# Patient Record
Sex: MALE | Race: WHITE | NOT HISPANIC OR LATINO | Employment: FULL TIME | URBAN - METROPOLITAN AREA
[De-identification: names, ages, dates, MRNs, and addresses within clinical notes are randomized per-mention and may not be internally consistent; named-entity substitution may affect disease eponyms.]

---

## 2017-01-24 ENCOUNTER — ANTICOAGULATION VISIT (OUTPATIENT)
Dept: CARDIOLOGY | Facility: CLINIC | Age: 57
End: 2017-01-24

## 2017-01-24 LAB — INR PPP: 3

## 2017-02-22 LAB — INR PPP: 2.5

## 2017-02-23 ENCOUNTER — ANTICOAGULATION VISIT (OUTPATIENT)
Dept: CARDIOLOGY | Facility: CLINIC | Age: 57
End: 2017-02-23

## 2017-03-22 ENCOUNTER — ANTICOAGULATION VISIT (OUTPATIENT)
Dept: CARDIOLOGY | Facility: CLINIC | Age: 57
End: 2017-03-22

## 2017-03-22 LAB — INR PPP: 2.7

## 2017-04-26 ENCOUNTER — ANTICOAGULATION VISIT (OUTPATIENT)
Dept: CARDIOLOGY | Facility: CLINIC | Age: 57
End: 2017-04-26

## 2017-04-26 LAB — INR PPP: 2.4

## 2017-05-24 ENCOUNTER — ANTICOAGULATION VISIT (OUTPATIENT)
Dept: CARDIOLOGY | Facility: CLINIC | Age: 57
End: 2017-05-24

## 2017-05-24 LAB — INR PPP: 2.7

## 2017-06-27 ENCOUNTER — ANTICOAGULATION VISIT (OUTPATIENT)
Dept: CARDIOLOGY | Facility: CLINIC | Age: 57
End: 2017-06-27

## 2017-06-27 LAB — INR PPP: 2.4

## 2017-07-28 ENCOUNTER — ANTICOAGULATION VISIT (OUTPATIENT)
Dept: CARDIOLOGY | Facility: CLINIC | Age: 57
End: 2017-07-28

## 2017-07-28 LAB — INR PPP: 2.4

## 2017-08-04 ENCOUNTER — OFFICE VISIT (OUTPATIENT)
Dept: UROLOGY | Facility: CLINIC | Age: 57
End: 2017-08-04

## 2017-08-04 VITALS
WEIGHT: 190 LBS | SYSTOLIC BLOOD PRESSURE: 110 MMHG | HEART RATE: 82 BPM | DIASTOLIC BLOOD PRESSURE: 62 MMHG | OXYGEN SATURATION: 96 % | TEMPERATURE: 98 F | BODY MASS INDEX: 29.76 KG/M2

## 2017-08-04 DIAGNOSIS — R97.20 ELEVATED PROSTATE SPECIFIC ANTIGEN (PSA): Primary | ICD-10-CM

## 2017-08-04 DIAGNOSIS — N13.8 BPH (BENIGN PROSTATIC HYPERTROPHY) WITH URINARY OBSTRUCTION: ICD-10-CM

## 2017-08-04 DIAGNOSIS — N40.1 BPH (BENIGN PROSTATIC HYPERTROPHY) WITH URINARY OBSTRUCTION: ICD-10-CM

## 2017-08-04 LAB
BILIRUB BLD-MCNC: NEGATIVE MG/DL
CLARITY, POC: CLEAR
COLOR UR: YELLOW
GLUCOSE UR STRIP-MCNC: NEGATIVE MG/DL
KETONES UR QL: NEGATIVE
LEUKOCYTE EST, POC: NEGATIVE
NITRITE UR-MCNC: NEGATIVE MG/ML
PH UR: 6 [PH] (ref 5–8)
PROT UR STRIP-MCNC: ABNORMAL MG/DL
RBC # UR STRIP: NEGATIVE /UL
SP GR UR: 1.03 (ref 1–1.03)
UROBILINOGEN UR QL: NORMAL

## 2017-08-04 PROCEDURE — 81003 URINALYSIS AUTO W/O SCOPE: CPT | Performed by: UROLOGY

## 2017-08-04 PROCEDURE — 76857 US EXAM PELVIC LIMITED: CPT | Performed by: UROLOGY

## 2017-08-04 PROCEDURE — 99203 OFFICE O/P NEW LOW 30 MIN: CPT | Performed by: UROLOGY

## 2017-08-04 RX ORDER — RANITIDINE 150 MG/1
TABLET ORAL
COMMUNITY
Start: 2015-04-06 | End: 2017-08-04 | Stop reason: SDUPTHER

## 2017-08-04 RX ORDER — CLOPIDOGREL BISULFATE 75 MG/1
TABLET ORAL DAILY
COMMUNITY
Start: 2015-02-06 | End: 2017-08-04

## 2017-08-04 RX ORDER — LOSARTAN POTASSIUM 25 MG/1
TABLET ORAL
COMMUNITY
Start: 2015-02-06 | End: 2017-08-04 | Stop reason: DRUGHIGH

## 2017-08-04 NOTE — PROGRESS NOTES
Chief Complaint  Elevated PSA (Anthony Huitron ref patient for a psa of 4.4)        MANUEL Ortega is a 56 y.o. male who is referred for evaluation of his prostate after a recent PSA was elevated at 4.4.  He's had this checked before one to 2 years ago and he thinks it was about 4.2.  Of some concern is that his brother is currently being treated for prostate cancer.  He has a history of coronary artery disease has had a stent inserted and is currently taking Coumadin and aspirin although he is off Plavix.    Vitals:    08/04/17 1040   BP: 110/62   Pulse: 82   Temp: 98 °F (36.7 °C)   SpO2: 96%       Past Medical History  Past Medical History:   Diagnosis Date   • Atrial fibrillation    • Coronary artery disease    • Dyslipidemia    • Hypertension     Borderline hypertension.   • Lateral myocardial infarction     01/08/2015: Lateral STEMI with emergent 3.0 x 18 Xience KENZIE to first diagonal, non-obstructive disease otherwise. EF 45%.        Past Surgical History  Past Surgical History:   Procedure Laterality Date   • CORONARY ANGIOPLASTY WITH STENT PLACEMENT  01/08/2015 01/08/2015: Lateral STEMI with emergent 3.0 x 18 Xience KENZEI to first diagonal, non-obstructive disease otherwise. EF 45%.        Medications  has a current medication list which includes the following prescription(s): aspirin, atorvastatin, losartan, nitroglycerin, ranitidine, and warfarin.      Allergies  Allergies   Allergen Reactions   • Penicillins        Social History  Social History     Social History Narrative       Family History  He has no family history of bladder or kidney cancer  He has no family history of kidney stones      AUA Symptom Score:      Review of Systems  Review of Systems   Constitutional: Negative.    Genitourinary: Negative.    All other systems reviewed and are negative.      Physical Exam  Physical Exam   Constitutional: He is oriented to person, place, and time. He appears well-developed and well-nourished.   HENT:    Head: Normocephalic and atraumatic.   Neck: Normal range of motion.   Pulmonary/Chest: Effort normal. No respiratory distress.   Abdominal: Soft. He exhibits no distension and no mass. There is no tenderness. No hernia. Hernia confirmed negative in the right inguinal area and confirmed negative in the left inguinal area.   Genitourinary: Rectum normal, prostate normal, testes normal and penis normal.   Musculoskeletal: Normal range of motion.   Lymphadenopathy:     He has no cervical adenopathy. No inguinal adenopathy noted on the right or left side.   Neurological: He is alert and oriented to person, place, and time.   Skin: Skin is warm and dry.   Psychiatric: He has a normal mood and affect. His behavior is normal.   Vitals reviewed.      Labs Recent and today in the office:  Results for orders placed or performed in visit on 08/04/17   POC Urinalysis Dipstick, Automated   Result Value Ref Range    Color Yellow Yellow, Straw, Dark Yellow, Greer    Clarity, UA Clear Clear    Glucose, UA Negative Negative, 1000 mg/dL (3+) mg/dL    Bilirubin Negative Negative    Ketones, UA Negative Negative    Specific Gravity  1.030 1.005 - 1.030    Blood, UA Negative Negative    pH, Urine 6.0 5.0 - 8.0    Protein, POC Trace (A) Negative mg/dL    Urobilinogen, UA Normal Normal    Leukocytes Negative Negative    Nitrite, UA Negative Negative         Assessment & Plan    His digital rectal exam is benign.  A pelvic ultrasound reveals a 34 g prostate which calculates a PSA density of 0.13 in the benign range.  The patient is instructed to eat a heart healthy diet to lower his risk of prostate cancer although he mainly eats beef and doesn't like chicken.  A total to free PSA ratio is obtained today and he will return in 4 months for close surveillance.  If his risk progresses he will need a prostate biopsy.

## 2017-08-04 NOTE — PROGRESS NOTES
[facility]- UROLOGY  [address], [address]  [place], [place] [address]  [phone]      08/04/2017    Eliz Ortega  1960        Pelvic Ultrasound with PVR    A transabdominal pelvic ultrasound was performed using a 3.5 MHz transducer of a B-K Salazar through the suprapubic area.     The purpose of the study was to investigate the patient's voiding difficulty.  There was minimal bladder wall thickening noted.  There were no intravesical filling defects seen.  The post void residual of 34.9 ml was noted.  Prostate was homogeneous and was noted to be 33.8 grams.  There was a protrusion of the prostate into the bladder.  Ultrasound images will be scanned into the chart for reference.    PSA density is 0.13 with normal less than than 0.15.   CPT code 36661        Performed by Chad Xiong MD

## 2017-08-05 LAB
PSA FREE MFR SERPL: 16.4 %
PSA FREE SERPL-MCNC: 0.97 NG/ML
PSA SERPL-MCNC: 5.9 NG/ML (ref 0–4)

## 2017-08-23 ENCOUNTER — ANTICOAGULATION VISIT (OUTPATIENT)
Dept: CARDIOLOGY | Facility: CLINIC | Age: 57
End: 2017-08-23

## 2017-08-23 LAB — INR PPP: 2.9

## 2017-09-07 ENCOUNTER — OFFICE VISIT (OUTPATIENT)
Dept: CARDIOLOGY | Facility: CLINIC | Age: 57
End: 2017-09-07

## 2017-09-07 VITALS
BODY MASS INDEX: 29.6 KG/M2 | SYSTOLIC BLOOD PRESSURE: 134 MMHG | WEIGHT: 188.6 LBS | HEIGHT: 67 IN | HEART RATE: 96 BPM | DIASTOLIC BLOOD PRESSURE: 96 MMHG

## 2017-09-07 DIAGNOSIS — I10 ESSENTIAL HYPERTENSION: ICD-10-CM

## 2017-09-07 DIAGNOSIS — I25.10 CORONARY ARTERY DISEASE INVOLVING NATIVE CORONARY ARTERY OF NATIVE HEART WITHOUT ANGINA PECTORIS: Primary | ICD-10-CM

## 2017-09-07 DIAGNOSIS — I48.0 PAROXYSMAL ATRIAL FIBRILLATION (HCC): ICD-10-CM

## 2017-09-07 DIAGNOSIS — E78.2 MIXED HYPERLIPIDEMIA: ICD-10-CM

## 2017-09-07 PROCEDURE — 99214 OFFICE O/P EST MOD 30 MIN: CPT | Performed by: INTERNAL MEDICINE

## 2017-09-07 RX ORDER — BISOPROLOL FUMARATE 5 MG/1
2.5 TABLET, FILM COATED ORAL DAILY
COMMUNITY

## 2017-09-07 NOTE — PROGRESS NOTES
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 Coronary artery disease:  a. 01/08/2015: Lateral STEMI with emergent 3.0 x 18 Xience KENZIE to first diagonal, non-obstructive disease  otherwise. EF 45%.   2. Atrial fibrillation, new onset, unclear duration:   a. Cardioversion x2, 01/08/2015 and 01/09/2015, with conversion to sinus mechanism.  Initiation of sotalol  and warfarin.   b. 02/05/2015: Documentation of recurrent atrial fibrillation, unknown duration, asymptomatic.  Committed to rate control anticoagulation.   3. Dyslipidemia.   4. Borderline hypertension.     Allergies  Allergies   Allergen Reactions   • Penicillins        Current Medications    Current Outpatient Prescriptions:   •  aspirin 81 MG EC tablet, Take 81 mg by mouth Daily., Disp: , Rfl:   •  atorvastatin (LIPITOR) 20 MG tablet, Take  by mouth., Disp: , Rfl:   •  bisoprolol (ZEBeta) 5 MG tablet, Take 2.5 mg by mouth Daily., Disp: , Rfl:   •  losartan (COZAAR) 12.5 MG half tablet, Take 12.5 mg by mouth Daily., Disp: , Rfl:   •  nitroglycerin (NITROSTAT) 0.4 MG SL tablet, Place  under the tongue., Disp: , Rfl:   •  ranitidine (ZANTAC) 150 MG tablet, Take 150 mg by mouth 2 (Two) Times a Day., Disp: , Rfl:   •  warfarin (COUMADIN) 10 MG tablet, Take 1 tablet by mouth Daily., Disp: 90 tablet, Rfl: 0      History of Present Illness   Patient here for routine 9 month follow-up.  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  Follows regularly with PCP.   "States he very sporadically checks BP at home.  Notices occasional palpitations with his A. fib however this does not bother him, he does not feel his heart \"racing\".  Pt denies any chest pain, dyspnea, dyspnea on exertion, orthopnea, PND, lower extremity edema, or claudication    ROS:  All systems have been reviewed and are negative with the exception of those mentioned in the HPI.    OBJECTIVE:  Vitals:    09/07/17 1100   BP: 134/96   BP Location: Right arm   Patient Position: Sitting   Pulse: 96   Weight: 188 lb 9.6 oz (85.5 kg)   Height: 67\" (170.2 cm)     Physical Exam   Constitutional: He is oriented to person, place, and time. He appears well-developed and well-nourished. No distress.   HENT:   alopecia   Cardiovascular: Normal rate, regular rhythm, normal heart sounds and intact distal pulses.    No murmur heard.  Pulses:       Radial pulses are 2+ on the right side, and 2+ on the left side.        Dorsalis pedis pulses are 2+ on the right side, and 2+ on the left side.        Posterior tibial pulses are 2+ on the right side, and 2+ on the left side.   Pulmonary/Chest: Effort normal and breath sounds normal. He has no wheezes. He has no rales.   Abdominal: Soft. Bowel sounds are normal. There is no tenderness. There is no guarding.   Musculoskeletal: He exhibits no edema or tenderness.   Neurological: He is alert and oriented to person, place, and time.   Skin: Skin is warm and dry. No rash noted.   Psychiatric: He has a normal mood and affect.   Nursing note and vitals reviewed.      Diagnostic Data:  Procedures      ASSESSMENT:   Diagnosis Plan   1. Coronary artery disease involving native coronary artery of native heart without angina pectoris     2. Paroxysmal atrial fibrillation     3. Essential hypertension     4. Mixed hyperlipidemia         PLAN:  1. No anginal equivalent, patient stays active with yard work, an active job.  Continue medical management  2. Rate controlled and anticoagulated with " warfarin, INRs are monitored  3. Slightly up today with historical acceptable control,, patient encouraged to check BP more often at home, continue antihypertensives  4. Labs per PCP, continue statin therapy on atorvastatin    RTC 9 months, sooner if needed    Anthony Mukherjee MD, thank you for referring Mr. Ortega for evaluation.  I have forwarded my electronically generated recommendations to you for review.  Please do not hesitate to call with any questions.    Scribed for Otilio Sanchez MD by Ana Rosa Cain PA-C. 9/7/2017  11:25 AM  I, Otilio Sanchez MD, personally performed the services described in this documentation as scribed by the above named individual in my presence, and it is both accurate and complete.  9/7/2017  12:01 PM    Otilio Sanchez MD, [facility]

## 2017-09-26 ENCOUNTER — ANTICOAGULATION VISIT (OUTPATIENT)
Dept: PHARMACY | Facility: HOSPITAL | Age: 57
End: 2017-09-26

## 2017-09-26 LAB — INR PPP: 2.3

## 2017-09-26 NOTE — PROGRESS NOTES
Anticoagulation Clinic - Remote Progress Note  REMOTE LAB    Indication: afib  Referring Provider: Laura  Initial Warfarin Start Date:   Goal INR: 2.0-3.0  Current Drug Interactions: aspirin, ranitidine  CHADS-VASc:     Diet: GLV: not at all  Alcohol: none  Tobacco: 1 can of dip/smokeless tobacco a day  OTC Pain Medication: Ibuprofen or APAP -- advised to minimize ibuprofen use, if at all, and to use APAP whenever possible    INR History:  Date 8/23 9/26          Total Weekly Dose 70mg 70mg          INR 2.9 2.3          Notes  1st clinic            Phone Interview:  Tablet Strength: pt has 10mg tablets  Current Maintenance Dose: 10mg every day  Patient Findings      Negatives Signs/symptoms of thrombosis, Signs/symptoms of bleeding, Laboratory test error suspected, Change in health, Change in alcohol use, Change in activity, Upcoming invasive procedure, Emergency department visit, Upcoming dental procedure, Missed doses, Extra doses, Change in medications, Change in diet/appetite, Hospital admission, Bruising, Other complaints     Comments Wife says he uses 1 can of dip/smokeless tobacco a day     Patient Contact Info: [phone] or [phone] (Wife, Geni)  Lab Contact Info: [facility] [phone]    Plan:  1. INR is therapeutic. Instructed pt to continue maintenance dose of warfarin 10mg every day.  2. Repeat INR in 4 weeks.  3. Pt requests warfarin refills called into [facility] in [place], [place].  4. Verbal information provided over the phone to pt's daughter. She RBV dosing instructions, expresses understanding, and has no further questions at this time.    Fredrick Rosales  9/26/2017  12:16 PM      IJessica, [facility], have reviewed the note in full and agree with the assessment and plan.  09/27/17  8:07 AM

## 2017-09-27 DIAGNOSIS — I48.91 ATRIAL FIBRILLATION, UNSPECIFIED TYPE (HCC): Primary | ICD-10-CM

## 2017-09-27 RX ORDER — WARFARIN SODIUM 10 MG/1
TABLET ORAL
Qty: 90 TABLET | Refills: 1 | OUTPATIENT
Start: 2017-09-27 | End: 2018-03-23 | Stop reason: SDUPTHER

## 2017-10-24 ENCOUNTER — ANTICOAGULATION VISIT (OUTPATIENT)
Dept: PHARMACY | Facility: HOSPITAL | Age: 57
End: 2017-10-24

## 2017-10-24 DIAGNOSIS — I48.0 PAROXYSMAL ATRIAL FIBRILLATION (HCC): ICD-10-CM

## 2017-10-24 LAB — INR PPP: 2.4

## 2017-10-24 NOTE — PROGRESS NOTES
Anticoagulation Clinic - Remote Progress Note  REMOTE LAB    Indication: afib  Referring Provider: Laura  Initial Warfarin Start Date:   Goal INR: 2.0-3.0  Current Drug Interactions: aspirin, ranitidine  CHADS-VASc:     Diet: GLV: not at all  Alcohol: none  Tobacco: 1 can of dip/smokeless tobacco a day  OTC Pain Medication: Ibuprofen or APAP -- advised to minimize ibuprofen use, if at all, and to use APAP whenever possible    INR History:  Date 8/23 9/26 10/24         Total Weekly Dose 70mg 70mg 70mg         INR 2.9 2.3 2.4         Notes  1st clinic            Phone Interview:  Tablet Strength: pt has 10mg tablets  Current Maintenance Dose: 10mg every day  Patient Findings      Negatives Signs/symptoms of thrombosis, Signs/symptoms of bleeding, Laboratory test error suspected, Change in health, Change in alcohol use, Change in activity, Upcoming invasive procedure, Emergency department visit, Upcoming dental procedure, Missed doses, Extra doses, Change in medications, Change in diet/appetite, Hospital admission, Bruising, Other complaints     Patient Contact Info: [phone] or [phone] (Wife, Geni)  Lab Contact Info: [facility] [phone]    Plan:  1. INR is therapeutic. Instructed pt's wife to continue maintenance dose of warfarin 10mg daily.  2. Repeat INR in 4 weeks.  3. Pt declines warfarin refills (last called in 9/26).  4. Verbal information provided over the phone to pt's wife. She RBV dosing instructions, expresses understanding, and has no further questions at this time.    Fredrick Rosales CPhT  10/24/2017  12:14 PM    Jessica MARTIN [facility], have reviewed the note in full and agree with the assessment and plan.  10/25/17  7:36 AM

## 2017-11-22 ENCOUNTER — ANTICOAGULATION VISIT (OUTPATIENT)
Dept: PHARMACY | Facility: HOSPITAL | Age: 57
End: 2017-11-22

## 2017-11-22 DIAGNOSIS — I48.91 ATRIAL FIBRILLATION, UNSPECIFIED TYPE (HCC): Primary | ICD-10-CM

## 2017-11-22 LAB — INR PPP: 2.9

## 2017-11-22 NOTE — PROGRESS NOTES
Line busy, unable to leave voicemail - 11/22@1054    Anticoagulation Clinic - Remote Progress Note  REMOTE LAB    Indication: afib  Referring Provider: Laura  Initial Warfarin Start Date:   Goal INR: 2.0-3.0  Current Drug Interactions: aspirin, ranitidine  CHADS-VASc:     Diet: GLV: not at all  Alcohol: none  Tobacco: 1 can of dip/smokeless tobacco a day  OTC Pain Medication: Ibuprofen or APAP -- advised to minimize ibuprofen use, if at all, and to use APAP whenever possible    INR History:  Date 8/23 9/26 10/24 11/22        Total Weekly Dose 70mg 70mg 70mg 70mg        INR 2.9 2.3 2.4 2.9        Notes  1st clinic            Phone Interview:  Tablet Strength: pt has 10mg tablets  Current Maintenance Dose: 10mg every day    Patient Findings      Negatives Signs/symptoms of thrombosis, Signs/symptoms of bleeding, Laboratory test error suspected, Change in health, Change in alcohol use, Change in activity, Upcoming invasive procedure, Emergency department visit, Upcoming dental procedure, Missed doses, Extra doses, Change in medications, Change in diet/appetite, Hospital admission, Bruising, Other complaints     Patient Contact Info: [phone] or [phone] (Wife, Geni)  Lab Contact Info: [facility] [place] [facility] - [phone]    Plan:  1. INR is therapeutic. Instructed pt to continue maintenance dose of warfarin 10mg daily.  2. Repeat INR in 4 weeks.  3. Pt declines warfarin refills (last called in 9/26).  4. Verbal information provided over the phone. Pt RBV dosing instructions, expresses understanding by teach back, and has no further questions at this time.  5. Pt requests new standing order be sent to [facility] [place] [facility], old one expires some time in December 2017    Fredrick Rosales CPhT  11/22/2017  10:50 AM

## 2017-11-28 NOTE — PROGRESS NOTES
I, Jessica Recio [facility], have reviewed the note in full and agree with the assessment and plan.  11/28/17  8:19 AM

## 2017-12-06 ENCOUNTER — LAB (OUTPATIENT)
Dept: UROLOGY | Facility: CLINIC | Age: 57
End: 2017-12-06

## 2017-12-06 DIAGNOSIS — R97.20 ELEVATED PSA: Primary | ICD-10-CM

## 2017-12-06 LAB — PSA SERPL-MCNC: 5.22 NG/ML (ref 0.06–4)

## 2017-12-12 ENCOUNTER — OFFICE VISIT (OUTPATIENT)
Dept: UROLOGY | Facility: CLINIC | Age: 57
End: 2017-12-12

## 2017-12-12 VITALS
SYSTOLIC BLOOD PRESSURE: 135 MMHG | TEMPERATURE: 98.4 F | BODY MASS INDEX: 29.51 KG/M2 | HEIGHT: 67 IN | DIASTOLIC BLOOD PRESSURE: 76 MMHG | HEART RATE: 79 BPM | WEIGHT: 188 LBS | OXYGEN SATURATION: 96 %

## 2017-12-12 DIAGNOSIS — Z80.42 FAMILY HISTORY OF PROSTATE CANCER: ICD-10-CM

## 2017-12-12 DIAGNOSIS — Z87.898 HISTORY OF ELEVATED PSA: Primary | ICD-10-CM

## 2017-12-12 LAB
BILIRUB BLD-MCNC: NEGATIVE MG/DL
CLARITY, POC: CLEAR
COLOR UR: YELLOW
GLUCOSE UR STRIP-MCNC: NEGATIVE MG/DL
KETONES UR QL: NEGATIVE
LEUKOCYTE EST, POC: NEGATIVE
NITRITE UR-MCNC: NEGATIVE MG/ML
PH UR: 6 [PH] (ref 5–8)
PROT UR STRIP-MCNC: NEGATIVE MG/DL
RBC # UR STRIP: NEGATIVE /UL
SP GR UR: 1.02 (ref 1–1.03)
UROBILINOGEN UR QL: NORMAL

## 2017-12-12 PROCEDURE — 81003 URINALYSIS AUTO W/O SCOPE: CPT | Performed by: UROLOGY

## 2017-12-12 PROCEDURE — 99213 OFFICE O/P EST LOW 20 MIN: CPT | Performed by: UROLOGY

## 2017-12-12 NOTE — PROGRESS NOTES
Chief Complaint  Elevated PSA (family history of prostate cancer in his brother. Patient is here for 4 month fup.)        MANUEL Ortega is a 57 y.o. male who returns today for follow-up of his prostate in light of a elevated PSA and a family history of prostate cancer and his older brother.  On his last visit a total free ratio indicated a 23% chance of at least microscopic disease.  Fortunately during the last 4 months his PSA is actually declined from 5.9 5.2.  He continues to minimize any difficulty voiding.    Vitals:    12/12/17 1304   BP: 135/76   Pulse: 79   Temp: 98.4 °F (36.9 °C)   SpO2: 96%       Past Medical History  Past Medical History:   Diagnosis Date   • Atrial fibrillation    • Coronary artery disease    • Dyslipidemia    • Hypertension     Borderline hypertension.   • Lateral myocardial infarction     01/08/2015: Lateral STEMI with emergent 3.0 x 18 Xience KENZIE to first diagonal, non-obstructive disease otherwise. EF 45%.        Past Surgical History  Past Surgical History:   Procedure Laterality Date   • CARDIAC CATHETERIZATION     • CORONARY ANGIOPLASTY WITH STENT PLACEMENT  01/08/2015 01/08/2015: Lateral STEMI with emergent 3.0 x 18 Xience KENZIE to first diagonal, non-obstructive disease otherwise. EF 45%.    • FINGER SURGERY         Medications  has a current medication list which includes the following prescription(s): aspirin, atorvastatin, bisoprolol, losartan, nitroglycerin, ranitidine, and warfarin.      Allergies  Allergies   Allergen Reactions   • Penicillins        Social History  Social History     Social History Narrative       Family History  He has no family history of bladder or kidney cancer  He has no family history of kidney stones      AUA Symptom Score:      Review of Systems  Review of Systems    Physical Exam  Physical Exam   Constitutional: He is oriented to person, place, and time. He appears well-developed and well-nourished.   HENT:   Head: Normocephalic and atraumatic.    Neck: Normal range of motion.   Pulmonary/Chest: Effort normal. No respiratory distress.   Abdominal: Soft. He exhibits no distension and no mass. There is no tenderness. No hernia.   Genitourinary: Rectum normal and prostate normal.   Musculoskeletal: Normal range of motion.   Lymphadenopathy:     He has no cervical adenopathy.   Neurological: He is alert and oriented to person, place, and time.   Skin: Skin is warm and dry.   Psychiatric: He has a normal mood and affect. His behavior is normal.   Vitals reviewed.      Labs Recent and today in the office:  Results for orders placed or performed in visit on 12/12/17   POC Urinalysis Dipstick, Automated   Result Value Ref Range    Color Yellow Yellow, Straw, Dark Yellow, Greer    Clarity, UA Clear Clear    Glucose, UA Negative Negative, 1000 mg/dL (3+) mg/dL    Bilirubin Negative Negative    Ketones, UA Negative Negative    Specific Gravity  1.025 1.005 - 1.030    Blood, UA Negative Negative    pH, Urine 6.0 5.0 - 8.0    Protein, POC Negative Negative mg/dL    Urobilinogen, UA Normal Normal    Leukocytes Negative Negative    Nitrite, UA Negative Negative         Assessment & Plan  Elevated PSA: Digital rectal exam reveals an enlarged prostate with minimal induration and no suspicious nodularity.  He's informed there is roughly a 10% chance of clinically significant cancer at this time and considering he takes Coumadin to prevent a stroke from atrial fibrillation I feel the biopsy risk outweighs the benefit.  He actually declines the suggestion of a biopsy at this time in any case.  I strongly encouraged close surveillance so he will return in 3 months with PSA.  Also recommend a heart healthy diet which may reduce his risk of getting prostate cancer.

## 2017-12-19 ENCOUNTER — ANTICOAGULATION VISIT (OUTPATIENT)
Dept: PHARMACY | Facility: HOSPITAL | Age: 57
End: 2017-12-19

## 2017-12-19 ENCOUNTER — TELEPHONE (OUTPATIENT)
Dept: PHARMACY | Facility: HOSPITAL | Age: 57
End: 2017-12-19

## 2017-12-19 DIAGNOSIS — I48.91 ATRIAL FIBRILLATION, UNSPECIFIED TYPE (HCC): Primary | ICD-10-CM

## 2017-12-19 LAB — INR PPP: 2.8

## 2017-12-19 NOTE — PROGRESS NOTES
Anticoagulation Clinic - Remote Progress Note  REMOTE LAB    Indication: afib  Referring Provider: Laura  Initial Warfarin Start Date:   Goal INR: 2.0-3.0  Current Drug Interactions: aspirin, ranitidine  CHADS-VASc:     Diet: GLV: not at all  Alcohol: none  Tobacco: 1 can of dip/smokeless tobacco a day  OTC Pain Medication: Ibuprofen or APAP -- advised to minimize ibuprofen use, if at all, and to use APAP whenever possible    INR History:  Date 8/23 9/26 10/24 11/22 12/19       Total Weekly Dose 70mg 70mg 70mg 70mg 70mg       INR 2.9 2.3 2.4 2.9 2.8       Notes  1st clinic            Phone Interview:  Tablet Strength: pt has 10mg tablets  Current Maintenance Dose: 10mg every day    Patient Findings      Negatives Signs/symptoms of thrombosis, Signs/symptoms of bleeding, Laboratory test error suspected, Change in health, Change in alcohol use, Change in activity, Upcoming invasive procedure, Emergency department visit, Upcoming dental procedure, Missed doses, Extra doses, Change in medications, Change in diet/appetite, Hospital admission, Bruising, Other complaints     Patient Contact Info: [phone] or [phone] (Wife, Geni)  Lab Contact Info: [facility] [phone]    Plan:  1. INR is therapeutic (2.8). Instructed pt to continue maintenance dose of warfarin 10mg daily.  2. Repeat INR in 4 weeks.  3. Pt declines warfarin refills (last called in 9/26).  4. Verbal information provided over the phone. Pt RBV dosing instructions, expresses understanding by teach back, and has no further questions at this time.    Fredrick Rosales, Zoila  12/19/2017  1:07 PM      Jennifer MARTIN, PharmD, have reviewed the note in full and agree with the assessment and plan.  12/19/17  4:31 PM

## 2018-01-23 ENCOUNTER — TELEPHONE (OUTPATIENT)
Dept: PHARMACY | Facility: HOSPITAL | Age: 58
End: 2018-01-23

## 2018-01-23 ENCOUNTER — ANTICOAGULATION VISIT (OUTPATIENT)
Dept: PHARMACY | Facility: HOSPITAL | Age: 58
End: 2018-01-23

## 2018-01-23 DIAGNOSIS — I48.91 ATRIAL FIBRILLATION, UNSPECIFIED TYPE (HCC): Primary | ICD-10-CM

## 2018-01-23 LAB — INR PPP: 2.4

## 2018-01-23 NOTE — PROGRESS NOTES
Anticoagulation Clinic - Remote Progress Note  REMOTE LAB    Indication: afib  Referring Provider: Laura  Initial Warfarin Start Date:   Goal INR: 2.0-3.0  Current Drug Interactions: aspirin, ranitidine  CHADS-VASc:     Diet: GLV: not at all  Alcohol: none  Tobacco: 1 can of dip/smokeless tobacco a day  OTC Pain Medication: Ibuprofen or APAP -- advised to minimize ibuprofen use, if at all, and to use APAP whenever possible    INR History:  Date 8/23 9/26 10/24 11/22 12/19 1/23      Total Weekly Dose 70mg 70mg 70mg 70mg 70mg 70mg      INR 2.9 2.3 2.4 2.9 2.8 2.4      Notes  1st clinic            Phone Interview:  Tablet Strength: pt has 10mg tablets  Current Maintenance Dose: 10mg every day  Patient Contact Info: [phone] or [phone] (Wife, Geni)  Lab Contact Info: [facility] [place] [facility] [phone]  Patient Findings      Negatives Signs/symptoms of thrombosis, Signs/symptoms of bleeding, Laboratory test error suspected, Change in health, Change in alcohol use, Change in activity, Upcoming invasive procedure, Emergency department visit, Upcoming dental procedure, Missed doses, Extra doses, Change in medications, Change in diet/appetite, Hospital admission, Bruising, Other complaints   Plan:  1. INR is therapeutic today at 2.4. Instructed pt to continue maintenance dose of warfarin 10mg daily.  2. Repeat INR in 4 weeks. (2/20)  3. Pt declines warfarin refills (last called in 9/26).  4. Verbal information provided over the phone. Pt RBV dosing instructions, expresses understanding by teach back, and has no further questions at this time.    Teresa Chand  1/23/2018  11:31 AM    Jennifer MARTIN, PharmD, have reviewed the note in full and agree with the assessment and plan.  01/23/18  4:37 PM

## 2018-02-21 ENCOUNTER — ANTICOAGULATION VISIT (OUTPATIENT)
Dept: PHARMACY | Facility: HOSPITAL | Age: 58
End: 2018-02-21

## 2018-02-21 LAB — INR PPP: 2.5

## 2018-02-21 NOTE — PROGRESS NOTES
Anticoagulation Clinic - Remote Progress Note  REMOTE LAB    Indication: afib  Referring Provider: Laura  Initial Warfarin Start Date:   Goal INR: 2.0-3.0  Current Drug Interactions: aspirin, ranitidine  CHADS-VASc:     Diet: GLV: not at all  Alcohol: none  Tobacco: 1 can of dip/smokeless tobacco a day  OTC Pain Medication: Ibuprofen or APAP -- advised to minimize ibuprofen use, if at all, and to use APAP whenever possible    INR History:  Date 8/23 9/26 10/24 11/22 12/19 1/23 2/21     Total Weekly Dose 70mg 70mg 70mg 70mg 70mg 70mg 70mg     INR 2.9 2.3 2.4 2.9 2.8 2.4 2.5     Notes  1st clinic            Phone Interview:  Tablet Strength: pt has 10mg tablets  Current Maintenance Dose: 10mg every day  Patient Contact Info: [phone] or [phone] (Wife, Geni)  Lab Contact Info: [facility] [phone]  Patient Findings      Negatives Signs/symptoms of thrombosis, Signs/symptoms of bleeding, Laboratory test error suspected, Change in health, Change in alcohol use, Change in activity, Upcoming invasive procedure, Emergency department visit, Upcoming dental procedure, Missed doses, Extra doses, Change in medications, Change in diet/appetite, Hospital admission, Bruising, Other complaints   Plan:  1. INR is therapeutic today at 2.5. recommend to continue 10 mg/day.  2. RTC 3/21 (4 weeks)      Fredrick Mustafa RPH  2/22/2018  2:31 PM

## 2018-03-08 DIAGNOSIS — R97.20 ELEVATED PSA: Primary | ICD-10-CM

## 2018-03-13 ENCOUNTER — OFFICE VISIT (OUTPATIENT)
Dept: UROLOGY | Facility: CLINIC | Age: 58
End: 2018-03-13

## 2018-03-13 VITALS
HEART RATE: 97 BPM | OXYGEN SATURATION: 97 % | HEIGHT: 67 IN | TEMPERATURE: 97.1 F | DIASTOLIC BLOOD PRESSURE: 90 MMHG | BODY MASS INDEX: 29.51 KG/M2 | WEIGHT: 188 LBS | SYSTOLIC BLOOD PRESSURE: 130 MMHG

## 2018-03-13 DIAGNOSIS — N13.8 BPH WITH URINARY OBSTRUCTION: ICD-10-CM

## 2018-03-13 DIAGNOSIS — R97.20 ELEVATED PSA: Primary | ICD-10-CM

## 2018-03-13 DIAGNOSIS — N40.1 BPH WITH URINARY OBSTRUCTION: ICD-10-CM

## 2018-03-13 LAB
BILIRUB BLD-MCNC: NEGATIVE MG/DL
CLARITY, POC: CLEAR
COLOR UR: YELLOW
GLUCOSE UR STRIP-MCNC: NEGATIVE MG/DL
KETONES UR QL: NEGATIVE
LEUKOCYTE EST, POC: NEGATIVE
NITRITE UR-MCNC: NEGATIVE MG/ML
PH UR: 6 [PH] (ref 5–8)
PROT UR STRIP-MCNC: NEGATIVE MG/DL
RBC # UR STRIP: NEGATIVE /UL
SP GR UR: 1.01 (ref 1–1.03)
UROBILINOGEN UR QL: NORMAL

## 2018-03-13 PROCEDURE — 99214 OFFICE O/P EST MOD 30 MIN: CPT | Performed by: UROLOGY

## 2018-03-13 PROCEDURE — 81003 URINALYSIS AUTO W/O SCOPE: CPT | Performed by: UROLOGY

## 2018-03-13 NOTE — PROGRESS NOTES
Chief Complaint  Elevated PSA (3 month follow-up)        HPI  Northern is a 57 y.o. male who returns today for follow-up of his prostate in light of an elevated PSA and a brother who has prostate cancer.  His PSA has been slightly elevated for some time and various different assays with a total free ratio suggesting a low but significant risk of cancer.  Unfortunately during the last interval his PSA is increased further to 6.43.  The risk of a biopsy is increased because of his need for blood thinner.    Vitals:    03/13/18 1311   BP: 130/90   Pulse: 97   Temp: 97.1 °F (36.2 °C)   SpO2: 97%       Past Medical History  Past Medical History:   Diagnosis Date   • Atrial fibrillation    • Coronary artery disease    • Dyslipidemia    • Hypertension     Borderline hypertension.   • Lateral myocardial infarction     01/08/2015: Lateral STEMI with emergent 3.0 x 18 Xience KENZIE to first diagonal, non-obstructive disease otherwise. EF 45%.        Past Surgical History  Past Surgical History:   Procedure Laterality Date   • CARDIAC CATHETERIZATION     • CORONARY ANGIOPLASTY WITH STENT PLACEMENT  01/08/2015 01/08/2015: Lateral STEMI with emergent 3.0 x 18 Xience KENZIE to first diagonal, non-obstructive disease otherwise. EF 45%.    • FINGER SURGERY         Medications  has a current medication list which includes the following prescription(s): aspirin, atorvastatin, bisoprolol, losartan, nitroglycerin, ranitidine, and warfarin.      Allergies  Allergies   Allergen Reactions   • Penicillins        Social History  Social History     Social History Narrative   • No narrative on file       Family History  He has no family history of bladder or kidney cancer  He has no family history of kidney stones      AUA Symptom Score:      Review of Systems  Review of Systems    Physical Exam  Physical Exam   Constitutional: He is oriented to person, place, and time. He appears well-developed and well-nourished.   HENT:   Head: Normocephalic  and atraumatic.   Neck: Normal range of motion.   Pulmonary/Chest: Effort normal. No respiratory distress.   Abdominal: Soft. He exhibits no distension and no mass. There is no tenderness. No hernia.   Genitourinary: Rectum normal and prostate normal.   Musculoskeletal: Normal range of motion.   Lymphadenopathy:     He has no cervical adenopathy.   Neurological: He is alert and oriented to person, place, and time.   Skin: Skin is warm and dry.   Psychiatric: He has a normal mood and affect. His behavior is normal.   Vitals reviewed.      Labs Recent and today in the office:  Results for orders placed or performed in visit on 03/13/18   POC Urinalysis Dipstick, Automated   Result Value Ref Range    Color Yellow Yellow, Straw, Dark Yellow, Greer    Clarity, UA Clear Clear    Glucose, UA Negative Negative, 1000 mg/dL (3+) mg/dL    Bilirubin Negative Negative    Ketones, UA Negative Negative    Specific Gravity  1.015 1.005 - 1.030    Blood, UA Negative Negative    pH, Urine 6.0 5.0 - 8.0    Protein, POC Negative Negative mg/dL    Urobilinogen, UA Normal Normal    Leukocytes Negative Negative    Nitrite, UA Negative Negative         Assessment & Plan  #1 elevated PSA with continued progression at this point I recommend proceeding with prostate biopsy at his earliest convenience.  He needs to check back with his cardiologist for stopping the Coumadin for 5 days prior to the biopsy in 2 days after.  He has an appointment with him in August and will return after that to schedule prostate biopsy.  Currently the patient is refusing.

## 2018-03-20 LAB — INR PPP: 2.3

## 2018-03-21 ENCOUNTER — ANTICOAGULATION VISIT (OUTPATIENT)
Dept: PHARMACY | Facility: HOSPITAL | Age: 58
End: 2018-03-21

## 2018-03-21 NOTE — PROGRESS NOTES
Anticoagulation Clinic - Remote Progress Note  REMOTE LAB    Indication: afib  Referring Provider: Laura  Initial Warfarin Start Date:   Goal INR: 2.0-3.0  Current Drug Interactions: aspirin, ranitidine  CHADS-VASc:     Diet: GLV: not at all  Alcohol: none  Tobacco: 1 can of dip/smokeless tobacco a day  OTC Pain Medication: Ibuprofen or APAP -- advised to minimize ibuprofen use, if at all, and to use APAP whenever possible    INR History:  Date 8/23 9/26 10/24 11/22 12/19 1/23 2/21 3/20    Total Weekly Dose 70mg 70mg 70mg 70mg 70mg 70mg 70mg 70mg    INR 2.9 2.3 2.4 2.9 2.8 2.4 2.5 2.3    Notes  1st clinic            Phone Interview:  Tablet Strength: pt has 10mg tablets  Current Maintenance Dose: 10mg every day  Patient Contact Info: [phone] or [phone] (Wife, Geni)  Lab Contact Info: [facility] [phone]    Patient Findings:   Negatives Signs/symptoms of thrombosis, Signs/symptoms of bleeding, Laboratory test error suspected, Change in health, Change in alcohol use, Change in activity, Upcoming invasive procedure, Emergency department visit, Upcoming dental procedure, Missed doses, Extra doses, Change in medications, Change in diet/appetite, Hospital admission, Bruising, Other complaints     Plan:  1. INR was therapeutic on 3/20 at 2.3. Instructed patient to continue warfarin 10mg daily.  2. Repeat INR in 4 weeks. (4/17)  3. Patient declines the need for warfarin refills at this time.   4. Verbal information provided over the phone to patient, who RBV dosing instructions, expresses teach back with understanding, and has no further questions at this time.     Teresa Chand, Pharmacy Technician  3/21/2018  11:52 AM    IrFedrick, [facility], have reviewed the note in full and agree with the assessment and plan.  03/21/18  12:17 PM

## 2018-03-23 DIAGNOSIS — I48.91 ATRIAL FIBRILLATION, UNSPECIFIED TYPE (HCC): ICD-10-CM

## 2018-03-23 RX ORDER — WARFARIN SODIUM 10 MG/1
TABLET ORAL
Qty: 90 TABLET | Refills: 0 | OUTPATIENT
Start: 2018-03-23 | End: 2018-08-07 | Stop reason: SDUPTHER

## 2018-03-23 NOTE — TELEPHONE ENCOUNTER
Will need refills for the weekend. Call to [facility] [place], [place]  Warfarin 10 mg 1 tablet po daily, #90, 1 refill.

## 2018-04-24 ENCOUNTER — ANTICOAGULATION VISIT (OUTPATIENT)
Dept: PHARMACY | Facility: HOSPITAL | Age: 58
End: 2018-04-24

## 2018-04-24 LAB — INR PPP: 2.9

## 2018-04-24 NOTE — PROGRESS NOTES
Anticoagulation Clinic - Remote Progress Note  REMOTE LAB    Indication: afib  Referring Provider: Laura  Initial Warfarin Start Date:   Goal INR: 2.0-3.0  Current Drug Interactions: aspirin, ranitidine  CHADS-VASc:     Diet: GLV: not at all  Alcohol: none  Tobacco: 1 can of dip/smokeless tobacco a day  OTC Pain Medication: Ibuprofen or APAP -- advised to minimize ibuprofen use, if at all, and to use APAP whenever possible    INR History:  Date 8/23 9/26 10/24 11/22 12/19 1/23 2/21 3/20 4/24   Total Weekly Dose 70mg 70mg 70mg 70mg 70mg 70mg 70mg 70mg 70mg   INR 2.9 2.3 2.4 2.9 2.8 2.4 2.5 2.3 2.9   Notes  1st clinic            Phone Interview:  Tablet Strength: pt has 10mg tablets  Current Maintenance Dose: 10mg every day  Patient Contact Info: [phone] or [phone] (Wife, Geni)  Lab Contact Info: [facility] [phone]    Patient Findings:   Negatives Signs/symptoms of thrombosis, Signs/symptoms of bleeding, Laboratory test error suspected, Change in health, Change in alcohol use, Change in activity, Upcoming invasive procedure, Emergency department visit, Upcoming dental procedure, Missed doses, Extra doses, Change in medications, Change in diet/appetite, Hospital admission, Bruising, Other complaints     Plan:  1. INR is therapeutic today at 2.9. Instructed patient to continue warfarin 10mg daily.  2. Repeat INR in 4 weeks. (5/22)  3. Patient declines the need for warfarin refills at this time.   4. Verbal information provided over the phone to patient, who RBV dosing instructions, expresses teach back with understanding, and has no further questions at this time.     Teresa Chand, Pharmacy Technician  4/24/2018  2:22 PM     IJessica, [facility], have reviewed the note in full and agree with the assessment and plan.  04/27/18  5:08 PM

## 2018-05-21 ENCOUNTER — ANTICOAGULATION VISIT (OUTPATIENT)
Dept: PHARMACY | Facility: HOSPITAL | Age: 58
End: 2018-05-21

## 2018-05-21 LAB — INR PPP: 2.1

## 2018-05-21 NOTE — PROGRESS NOTES
Anticoagulation Clinic - Remote Progress Note  REMOTE LAB    Indication: afib  Referring Provider: Laura  Initial Warfarin Start Date:   Goal INR: 2.0-3.0  Current Drug Interactions: aspirin, ranitidine  CHADS-VASc:     Diet: GLV: not at all  Alcohol: none  Tobacco: 1 can of dip/smokeless tobacco a day  OTC Pain Medication: Ibuprofen or APAP -- advised to minimize ibuprofen use, if at all, and to use APAP whenever possible    INR History:  Date 8/23 9/26 10/24 11/22 12/19 1/23 2/21 3/20 4/24   Total Weekly Dose 70mg 70mg 70mg 70mg 70mg 70mg 70mg 70mg 70  mg   INR 2.9 2.3 2.4 2.9 2.8 2.4 2.5 2.3 2.9   Notes  1st clinic            Date 5/21      Total Weekly Dose 70mg      INR 2.1      Notes           Phone Interview:  Tablet Strength: pt has 10mg tablets  Current Maintenance Dose: 10mg every day  Patient Contact Info: [phone] or [phone] (Wife, Geni)  Lab Contact Info: [facility] [phone]    Patient Findings:  Negatives:   Signs/symptoms of thrombosis, Signs/symptoms of bleeding, Laboratory test error suspected, Change in health, Change in alcohol use, Change in activity, Upcoming invasive procedure, Emergency department visit, Upcoming dental procedure, Missed doses, Extra doses, Change in medications, Change in diet/appetite, Hospital admission, Bruising, Other complaints   Comments:   Spoke with patient who declines any changes since last encounter     Plan:  1. INR is therapeutic today (5/21) at 2.1. Instructed patient to continue warfarin 10mg daily.  2. Repeat INR in 4 weeks. (6/18)  3. Patient declines the need for warfarin refills at this time.   4. Verbal information provided over the phone to patient, who RBV dosing instructions, expresses teach back with understanding, and has no further questions at this time.     Melissa Sanchez, Pharmacy Technician  5/21/2018  1:22 PM     Fredrick MARTIN, [facility], have reviewed the note in full and agree with the assessment and  plan.  05/22/18  8:05 AM

## 2018-06-20 ENCOUNTER — ANTICOAGULATION VISIT (OUTPATIENT)
Dept: PHARMACY | Facility: HOSPITAL | Age: 58
End: 2018-06-20

## 2018-06-20 LAB — INR PPP: 2.9

## 2018-06-20 NOTE — PROGRESS NOTES
Anticoagulation Clinic - Remote Progress Note  REMOTE LAB    Indication: afib  Referring Provider: Laura  Initial Warfarin Start Date:   Goal INR: 2.0-3.0  Current Drug Interactions: aspirin, ranitidine  CHADS-VASc:     Diet: GLV: varies, may have salad 2-3x a week or sometimes nothing for the month  Alcohol: none  Tobacco: 1 can of dip/smokeless tobacco a day  OTC Pain Medication: Ibuprofen or APAP -- advised to minimize ibuprofen use, if at all, and to use APAP whenever possible    INR History:  Date 8/23 9/26 10/24 11/22 12/19 1/23/18 2/21 3/20 4/24 5/21 6/20     Total Weekly Dose 70mg 70mg 70mg 70mg 70mg 70mg 70mg 70mg 70mg 70mg 70mg     INR 2.9 2.3 2.4 2.9 2.8 2.4 2.5 2.3 2.9 2.1 2.9     Notes  1st clinic                Phone Interview:  Tablet Strength: 10mg tablets  Current Maintenance Dose: 10mg every day  Patient Contact Info: [phone] or [phone] (Wife, Geni)  Lab Contact Info: [facility] [phone]    Patient Findings   Negatives:   Signs/symptoms of thrombosis, Signs/symptoms of bleeding, Laboratory test error suspected, Change in health, Change in alcohol use, Change in activity, Upcoming invasive procedure, Emergency department visit, Upcoming dental procedure, Missed doses, Extra doses, Change in medications, Change in diet/appetite, Hospital admission, Bruising, Other complaints   Comments:   may have salad 2-3x a week or sometimes nothing for the month     Plan:  1. INR is therapeutic today at 2.9. Instructed patient to continue warfarin 10mg daily.  2. Repeat INR in 4 weeks, 7/18  3. Verbal information provided over the phone to patient, who RBV dosing instructions, expresses teach back with understanding, and has no further questions at this time.   4. Patient declines the need for warfarin refills at this time.     Fredrick Rosales, Zoila  6/20/2018  12:03 PM        IFredrick, [facility], have reviewed the note in full and agree with the assessment and  plan.  06/20/18  12:17 PM

## 2018-07-05 ENCOUNTER — OFFICE VISIT (OUTPATIENT)
Dept: CARDIOLOGY | Facility: CLINIC | Age: 58
End: 2018-07-05

## 2018-07-05 VITALS
HEIGHT: 66 IN | BODY MASS INDEX: 31.5 KG/M2 | HEART RATE: 111 BPM | WEIGHT: 196 LBS | DIASTOLIC BLOOD PRESSURE: 80 MMHG | OXYGEN SATURATION: 98 % | SYSTOLIC BLOOD PRESSURE: 110 MMHG

## 2018-07-05 DIAGNOSIS — E78.2 MIXED HYPERLIPIDEMIA: ICD-10-CM

## 2018-07-05 DIAGNOSIS — I10 ESSENTIAL HYPERTENSION: ICD-10-CM

## 2018-07-05 DIAGNOSIS — I48.20 CHRONIC ATRIAL FIBRILLATION (HCC): Primary | ICD-10-CM

## 2018-07-05 PROCEDURE — 93000 ELECTROCARDIOGRAM COMPLETE: CPT | Performed by: INTERNAL MEDICINE

## 2018-07-05 PROCEDURE — 99213 OFFICE O/P EST LOW 20 MIN: CPT | Performed by: INTERNAL MEDICINE

## 2018-07-05 NOTE — PROGRESS NOTES
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 Coronary artery disease:  a. 01/08/2015: Lateral STEMI with emergent 3.0 x 18 Xience KENZIE to first diagonal, non-obstructive disease  otherwise. EF 45%.   2. Atrial fibrillation, new onset, unclear duration:   a. Cardioversion x2, 01/08/2015 and 01/09/2015, with conversion to sinus mechanism.  Initiation of sotalol  and warfarin.   b. 02/05/2015: Documentation of recurrent atrial fibrillation, unknown duration, asymptomatic.  Committed to rate control anticoagulation.   3. Dyslipidemia.   4. Borderline hypertension.     Allergies  Allergies   Allergen Reactions   • Penicillins Hives       Current Medications    Current Outpatient Prescriptions:   •  aspirin 81 MG EC tablet, Take 81 mg by mouth Daily., Disp: , Rfl:   •  atorvastatin (LIPITOR) 20 MG tablet, Take  by mouth Daily., Disp: , Rfl:   •  bisoprolol (ZEBeta) 5 MG tablet, Take 2.5 mg by mouth Daily., Disp: , Rfl:   •  losartan (COZAAR) 12.5 MG half tablet, Take 12.5 mg by mouth Daily., Disp: , Rfl:   •  nitroglycerin (NITROSTAT) 0.4 MG SL tablet, Place  under the tongue., Disp: , Rfl:   •  ranitidine (ZANTAC) 150 MG tablet, Take 150 mg by mouth Daily., Disp: , Rfl:   •  warfarin (COUMADIN) 10 MG tablet, Take 1 tablet by mouth daily, or as directed by the anticoagulation pharmacist, Disp: 90 tablet, Rfl: 0      History of Present Illness   Patient here for routine 9 month follow-up.    Pt denies any chest pain, dyspnea, dyspnea on exertion, orthopnea, PND, lower extremity edema, or claudication  Recent PSA 4.4 discussing having bx.    ROS:  All systems have been reviewed and are negative  "with the exception of those mentioned in the HPI.    OBJECTIVE:  Vitals:    07/05/18 1032   BP: 110/80   BP Location: Left arm   Patient Position: Sitting   Pulse: 111   SpO2: 98%   Weight: 88.9 kg (196 lb)   Height: 167.6 cm (66\")     Physical Exam   Constitutional: He is oriented to person, place, and time. He appears well-developed and well-nourished. No distress.   HENT:   alopecia   Cardiovascular: Normal rate, normal heart sounds and intact distal pulses.  An irregularly irregular rhythm present.   No murmur heard.  Pulses:       Radial pulses are 2+ on the right side, and 2+ on the left side.        Dorsalis pedis pulses are 2+ on the right side, and 2+ on the left side.        Posterior tibial pulses are 2+ on the right side, and 2+ on the left side.   Pulmonary/Chest: Effort normal and breath sounds normal. He has no wheezes. He has no rales.   Abdominal: Soft. Bowel sounds are normal. There is no tenderness. There is no guarding.   Musculoskeletal: He exhibits no edema or tenderness.   Neurological: He is alert and oriented to person, place, and time.   Skin: Skin is warm and dry. No rash noted.   Psychiatric: He has a normal mood and affect.   Nursing note and vitals reviewed.      Diagnostic Data:    ECG 12 Lead  Date/Time: 7/10/2018 2:22 PM  Performed by: JAVON DEL RIO  Authorized by: JAVON DEL RIO   Rhythm: atrial fibrillation  Clinical impression: dysrhythmia - atrial              ASSESSMENT:   Diagnosis Plan   1. Chronic atrial fibrillation (CMS/HCC)     2. Essential hypertension     3. Mixed hyperlipidemia         PLAN:  1. No anginal equivalent, patient stays active with yard work, an active job.  Continue medical management  2. Rate controlled and anticoagulated with warfarin, INRs are monitored  3. Slightly up today with historical acceptable control,, patient encouraged to check BP more often at home, continue antihypertensives  4. Labs per PCP, continue statin therapy on atorvastatin    RTC " 12 months, sooner if needed    Anthony Mukherjee MD, thank you for referring Mr. Ortega for evaluation.  I have forwarded my electronically generated recommendations to you for review.  Please do not hesitate to call with any questions.      I, Otilio Sanchez MD, personally performed the services described in this documentation as scribed by the above named individual in my presence, and it is both accurate and complete.  7/5/2018  10:49 AM    Otilio Sanchez MD, FACC

## 2018-07-23 LAB — INR PPP: 3.1

## 2018-07-24 ENCOUNTER — ANTICOAGULATION VISIT (OUTPATIENT)
Dept: PHARMACY | Facility: HOSPITAL | Age: 58
End: 2018-07-24

## 2018-07-24 DIAGNOSIS — I48.20 CHRONIC ATRIAL FIBRILLATION (HCC): ICD-10-CM

## 2018-07-24 NOTE — PROGRESS NOTES
Anticoagulation Clinic - Remote Progress Note  REMOTE LAB    Indication: chronic afib  Referring Provider: Laura  Initial Warfarin Start Date:   Goal INR: 2.0-3.0  Current Drug Interactions: aspirin, ranitidine  CHADS-VASc:     Diet: GLV: varies, may have salad 2-3x a week or sometimes nothing for the month  Alcohol: none  Tobacco: 1 can of dip/smokeless tobacco a day  OTC Pain Medication: Ibuprofen or APAP -- advised to minimize ibuprofen use, if at all, and to use APAP whenever possible    INR History:  Date 8/23 9/26 10/24 11/22 12/19 1/23/18 2/21 3/20 4/24 5/21 6/20 7/24    Total Weekly Dose 70mg 70mg 70mg 70mg 70mg 70mg 70mg 70mg 70mg 70mg 70mg 70mg    INR 2.9 2.3 2.4 2.9 2.8 2.4 2.5 2.3 2.9 2.1 2.9 3.1    Notes  1st clinic                Phone Interview:  Tablet Strength: 10mg tablets  Current Maintenance Dose: 10mg every day  Patient Contact Info: [phone] or [phone] (Wife, Geni)  Lab Contact Info: [facility] [phone]    Patient Findings   Negatives:   Signs/symptoms of thrombosis, Signs/symptoms of bleeding, Laboratory test error suspected, Change in health, Change in alcohol use, Change in activity, Upcoming invasive procedure, Emergency department visit, Upcoming dental procedure, Missed doses, Extra doses, Change in medications, Change in diet/appetite, Hospital admission, Bruising, Other complaints     Plan:  1. INR is slightly SUPRA therapeutic today at 3.1. After consulting with Jessica Recio, PharmD, instructed patient eat a serving of GLV tonight and then to continue warfarin 10mg daily.  2. Repeat INR in 2 weeks, 8/6  3. Verbal information provided over the phone to patient, who RBV dosing instructions, expresses teach back with understanding, and has no further questions at this time.   4. Patient declines the need for warfarin refills at this time.     Fredrick Rosales, Zoila  7/24/2018  9:41 AM     I, Maricarmen Grider, [facility], have reviewed the note in full and agree with the  assessment and plan.  07/24/18  1:42 PM

## 2018-08-07 ENCOUNTER — ANTICOAGULATION VISIT (OUTPATIENT)
Dept: PHARMACY | Facility: HOSPITAL | Age: 58
End: 2018-08-07

## 2018-08-07 DIAGNOSIS — I48.20 CHRONIC ATRIAL FIBRILLATION (HCC): ICD-10-CM

## 2018-08-07 DIAGNOSIS — I48.91 ATRIAL FIBRILLATION, UNSPECIFIED TYPE (HCC): ICD-10-CM

## 2018-08-07 LAB — INR PPP: 2.1

## 2018-08-07 NOTE — PROGRESS NOTES
Anticoagulation Clinic - Remote Progress Note  REMOTE LAB    Indication: chronic afib  Referring Provider: Crager  Initial Warfarin Start Date:   Goal INR: 2.0-3.0  Current Drug Interactions: aspirin, ranitidine  CHADS-VASc:     Diet: GLV: varies, may have salad 2-3x a week or sometimes nothing for the month  Alcohol: none  Tobacco: 1 can of dip/smokeless tobacco a day  OTC Pain Medication: Ibuprofen or APAP -- advised to minimize ibuprofen use, if at all, and to use APAP whenever possible    INR History:  Date 8/23 9/26 10/24 11/22 12/19 1/23/18 2/21 3/20 4/24 5/21 6/20 7/24 8/7   Total Weekly Dose 70mg 70mg 70mg 70mg 70mg 70mg 70mg 70mg 70mg 70mg 70mg 70mg 70mg   INR 2.9 2.3 2.4 2.9 2.8 2.4 2.5 2.3 2.9 2.1 2.9 3.1 2.1   Notes  1st clinic                Phone Interview:  Tablet Strength: 10mg tablets  Current Maintenance Dose: 10mg every day  Patient Contact Info: [phone] or [phone] (Wife, Geni)  Lab Contact Info: [facility] [phone]    Patient Findings:  Negatives:   Signs/symptoms of thrombosis, Signs/symptoms of bleeding, Laboratory test error suspected, Change in health, Change in alcohol use, Change in activity, Upcoming invasive procedure, Emergency department visit, Upcoming dental procedure, Missed doses, Extra doses, Change in medications, Change in diet/appetite, Hospital admission, Bruising, Other complaints   Comments:   Patient states there have been no changes since last encounter     Plan:  1. INR is back WNL today at 2.1. Instructed patient to continue warfarin 10mg daily.  2. Repeat INR in two weeks, 8/21.  3. Verbal information provided over the phone to patient, who RBV dosing instructions, expresses teach back with understanding, and has no further questions at this time.   4. Patient requests refills of warfarin 10mg tablets be called into [facility] in [place], [place]. Separate refill encounter created.    Melissa Sanchez CPhT  8/7/2018  1:08 PM     Fredrick MARTIN  [facility], have reviewed the note in full and agree with the assessment and plan.  08/07/18  2:46 PM

## 2018-08-13 RX ORDER — WARFARIN SODIUM 10 MG/1
TABLET ORAL
Qty: 90 TABLET | Refills: 1 | Status: SHIPPED | OUTPATIENT
Start: 2018-08-13 | End: 2018-09-26 | Stop reason: SDUPTHER

## 2018-08-21 ENCOUNTER — ANTICOAGULATION VISIT (OUTPATIENT)
Dept: PHARMACY | Facility: HOSPITAL | Age: 58
End: 2018-08-21

## 2018-08-21 DIAGNOSIS — I48.20 CHRONIC ATRIAL FIBRILLATION (HCC): ICD-10-CM

## 2018-08-21 LAB — INR PPP: 2.3

## 2018-09-25 ENCOUNTER — ANTICOAGULATION VISIT (OUTPATIENT)
Dept: PHARMACY | Facility: HOSPITAL | Age: 58
End: 2018-09-25

## 2018-09-25 DIAGNOSIS — I48.20 CHRONIC ATRIAL FIBRILLATION (HCC): ICD-10-CM

## 2018-09-25 LAB — INR PPP: 2.4

## 2018-09-25 NOTE — PROGRESS NOTES
Anticoagulation Clinic - Remote Progress Note  REMOTE LAB    Indication: chronic afib  Referring Provider: Crager  Initial Warfarin Start Date:   Goal INR: 2.0-3.0  Current Drug Interactions: aspirin, ranitidine  CHADS-VASc:     Diet: GLV: varies, may have salad 2-3x a week or sometimes nothing for the month  Alcohol: none  Tobacco: 1 can of dip/smokeless tobacco a day  OTC Pain Medication: Ibuprofen or APAP -- advised to minimize ibuprofen use, if at all, and to use APAP whenever possible    INR History:  Date 8/23 9/26 10/24 11/22 12/19 1/23/18 2/21 3/20 4/24 5/21 6/20 7/24 8/7   Total Weekly Dose 70mg 70mg 70mg 70mg 70mg 70mg 70mg 70mg 70mg 70mg 70mg 70mg 70mg   INR 2.9 2.3 2.4 2.9 2.8 2.4 2.5 2.3 2.9 2.1 2.9 3.1 2.1   Notes  1st clinic                Date 8/21 9/25              Total Weekly Dose 70mg 70mg              INR 2.3 2.4              Notes                  Phone Interview:  Tablet Strength: 10mg tablets  Current Maintenance Dose: 10mg every day  Patient Contact Info: [phone] or [phone] (Wife, Geni)  Lab Contact Info: [facility] [phone]    Patient Findings   Negatives:   Signs/symptoms of thrombosis, Signs/symptoms of bleeding, Laboratory test error suspected, Change in health, Change in alcohol use, Change in activity, Upcoming invasive procedure, Emergency department visit, Upcoming dental procedure, Missed doses, Extra doses, Change in medications, Change in diet/appetite, Hospital admission, Bruising, Other complaints     Plan:  1. INR was therapeutic on 9/25 at 2.4. Instructed patient's wife, Geni, to have patient continue warfarin 10mg daily.  2. Repeat INR in 4 weeks, 10/23  3. Verbal information provided over the phone. Eliz Ortega's wife, Geni, RBV dosing instructions, expresses understanding by teach back, and has no further questions at this time.  4. Patient's wife requests refills be called into [facility] in [place], [place]. Separate encounter  created.    Fredrick Rosales, Zoila  9/25/2018  2:42 PM     I, Jennifer Childress, PharmD, have reviewed the note in full and agree with the assessment and plan.  09/27/18  8:20 AM

## 2018-09-26 DIAGNOSIS — I48.91 ATRIAL FIBRILLATION, UNSPECIFIED TYPE (HCC): ICD-10-CM

## 2018-09-27 RX ORDER — WARFARIN SODIUM 10 MG/1
TABLET ORAL
Qty: 90 TABLET | Refills: 1 | Status: SHIPPED | OUTPATIENT
Start: 2018-09-27 | End: 2020-04-16 | Stop reason: SDUPTHER

## 2018-10-22 ENCOUNTER — ANTICOAGULATION VISIT (OUTPATIENT)
Dept: PHARMACY | Facility: HOSPITAL | Age: 58
End: 2018-10-22

## 2018-10-22 DIAGNOSIS — I48.20 CHRONIC ATRIAL FIBRILLATION (HCC): ICD-10-CM

## 2018-10-22 LAB — INR PPP: 3.1

## 2018-10-22 NOTE — PROGRESS NOTES
Anticoagulation Clinic - Remote Progress Note  REMOTE LAB    Indication: chronic afib  Referring Provider: Crager  Initial Warfarin Start Date:   Goal INR: 2.0-3.0  Current Drug Interactions: aspirin, ranitidine  CHADS-VASc:     Diet: GLV: varies, may have salad 2-3x a week or sometimes nothing for the month  Alcohol: none  Tobacco: 1 can of dip/smokeless tobacco a day  OTC Pain Medication: Ibuprofen or APAP -- advised to minimize ibuprofen use, if at all, and to use APAP whenever possible    INR History:  Date 8/23 9/26 10/24 11/22 12/19 1/23/18 2/21 3/20 4/24 5/21 6/20 7/24 8/7   Total Weekly Dose 70mg 70mg 70mg 70mg 70mg 70mg 70mg 70mg 70mg 70mg 70mg 70mg 70mg   INR 2.9 2.3 2.4 2.9 2.8 2.4 2.5 2.3 2.9 2.1 2.9 3.1 2.1   Notes  1st clinic                Date 8/21 9/25 10/22             Total Weekly Dose 70mg 70mg 80mg?             INR 2.3 2.4 3.1             Notes   Extra dose?               Phone Interview:  Tablet Strength: 10mg tablets  Current Maintenance Dose: 10mg every day  Patient Contact Info: [phone] or [phone] (Wife, Geni)  Lab Contact Info: [facility] [phone]    Patient Findings   Positives:   Extra doses   Negatives:   Signs/symptoms of thrombosis, Signs/symptoms of bleeding, Laboratory test error suspected, Change in health, Change in alcohol use, Change in activity, Upcoming invasive procedure, Emergency department visit, Upcoming dental procedure, Missed doses, Change in medications, Change in diet/appetite, Hospital admission, Bruising, Other complaints   Comments:   Patient's wife says he may have taken an extra dose on Sat, 10/20. They aren't 100% sure he did, but it might explain SUPRAtherapeutic INR     Plan:  1. INR is slightly SUPRAtherapeutic today at 3.1, possibly due to extra dose last Sat (see pt findings). After consulting with Maricarmen Grider, [facility]son[place], instructed patient's wife, Geni, to have patient eat a serving of GLV tonight and then continue warfarin 10mg  daily.  2. Repeat INR in 2 weeks, 11/5.  3. Verbal information provided over the phone. Eliz Ortega's wife, Geni, RBV dosing instructions, expresses understanding by teach back, and has no further questions at this time.  4. Patient's wife declines the need for warfarin refills at this time.    Fredrick Rosales CPhT  10/22/2018  4:47 PM    [facility], have reviewed the note in full and agree with the assessment and plan.  10/23/18  10:49 AM

## 2018-11-20 ENCOUNTER — ANTICOAGULATION VISIT (OUTPATIENT)
Dept: PHARMACY | Facility: HOSPITAL | Age: 58
End: 2018-11-20

## 2018-11-20 DIAGNOSIS — I48.20 CHRONIC ATRIAL FIBRILLATION (HCC): ICD-10-CM

## 2018-11-20 LAB — INR PPP: 3.03

## 2018-11-20 NOTE — PROGRESS NOTES
Anticoagulation Clinic - Remote Progress Note  REMOTE LAB    Indication: chronic afib  Referring Provider: Laura  Initial Warfarin Start Date:   Goal INR: 2.0-3.0  Current Drug Interactions: aspirin, ranitidine  CHADS-VASc: 1 (HTN)    Diet: GLV: varies, may have salad 2-3x a week or sometimes nothing for the month  Alcohol: none  Tobacco: 1 can of dip/smokeless tobacco a day  OTC Pain Medication: Ibuprofen or APAP -- advised to minimize ibuprofen use, if at all, and to use APAP whenever possible    INR History:  Date 8/23 9/26 10/24 11/22 12/19 1/23/18 2/21 3/20 4/24 5/21 6/20 7/24 8/7   Total Weekly Dose 70mg 70mg 70mg 70mg 70mg 70mg 70mg 70mg 70mg 70mg 70mg 70mg 70mg   INR 2.9 2.3 2.4 2.9 2.8 2.4 2.5 2.3 2.9 2.1 2.9 3.1 2.1   Notes  1st clinic                Date 8/21 9/25 10/22 11/20            Total Weekly Dose 70mg 70mg 80mg??? 70mg            INR 2.3 2.4 3.1 3.03            Notes   extra dose?               Phone Interview:  Tablet Strength: 10mg tablets  Patient Contact Info: [phone] or [phone] (Wife, Geni)  Lab Contact Info: [facility] [phone]    Patient Findings:  Negatives:  Signs/symptoms of thrombosis, Signs/symptoms of bleeding, Laboratory test error suspected, Change in health, Change in alcohol use, Change in activity, Upcoming invasive procedure, Emergency department visit, Upcoming dental procedure, Missed doses, Extra doses, Change in medications, Change in diet/appetite, Hospital admission, Bruising, Other complaints     Plan:  1. INR is slightly SUPRAtherapeutic today at 3.03. Instructed Mr. Ortega to eat a serving of GLV this week and continue warfarin 10mg daily.  2. Repeat INR in two weeks, 12/4.  3. Verbal information provided over the phone. Eliz Ortega RBV dosing instructions, expresses understanding by teach back, and has no further questions at this time.  4. Patient declines the need for warfarin refills at this time.    Melissa Sanchez,  CP  11/20/2018  1:58 PM    If INR is supratherapeutic again at follow up, consider lowering Mr. Ortega's maintenance dose to 65mg / week (7% dose decr.), especially given uncertainty of extra dose with last elevated INR.  IJessica, [facility], have reviewed the note in full and agree with the assessment and plan.  11/20/18  5:01 PM

## 2018-12-17 ENCOUNTER — ANTICOAGULATION VISIT (OUTPATIENT)
Dept: PHARMACY | Facility: HOSPITAL | Age: 58
End: 2018-12-17

## 2018-12-17 DIAGNOSIS — I48.20 CHRONIC ATRIAL FIBRILLATION (HCC): ICD-10-CM

## 2018-12-17 LAB — INR PPP: 1.89

## 2018-12-17 NOTE — PROGRESS NOTES
Anticoagulation Clinic - Remote Progress Note  REMOTE LAB    Indication: chronic afib  Referring Provider: Laura  Initial Warfarin Start Date:   Goal INR: 2.0-3.0  Current Drug Interactions: aspirin, ranitidine  CHADS-VASc: 1 (HTN)    Diet: GLV: varies, may have salad 2-3x a week or sometimes nothing for the month  Alcohol: none  Tobacco: 1 can of dip/smokeless tobacco a day  OTC Pain Medication: Ibuprofen or APAP -- advised to minimize ibuprofen use, if at all, and to use APAP whenever possible    INR History:  Date 8/23 9/26 10/24 11/22 12/19 1/23/18 2/21 3/20 4/24 5/21 6/20 7/24 8/7   Total Weekly Dose 70mg 70mg 70mg 70mg 70mg 70mg 70mg 70mg 70mg 70mg 70mg 70mg 70mg   INR 2.9 2.3 2.4 2.9 2.8 2.4 2.5 2.3 2.9 2.1 2.9 3.1 2.1   Notes  1st clinic                Date 8/21 9/25 10/22 11/20 12/17           Total Weekly Dose 70mg 70mg 80mg??? 70mg 70mg           INR 2.3 2.4 3.1 3.03 1.89           Notes   extra dose?  Inc GLV             Phone Interview:  Tablet Strength: 10mg tablets  Patient Contact Info: [phone] or [phone] (Wife, Geni)  Lab Contact Info: [facility] [phone]    Patient Findings:  Negatives:  Signs/symptoms of thrombosis, Signs/symptoms of bleeding, Laboratory test error suspected, Change in health, Change in alcohol use, Change in activity, Upcoming invasive procedure, Emergency department visit, Upcoming dental procedure, Missed doses, Extra doses, Change in medications, Change in diet/appetite, Hospital admission, Bruising, Other complaints     Plan:  1. INR is SUBherapeutic today at 1.89. Instructed Mr. Ortega to BOOST his dose today to 15mg then continue 10mg daily. He will plan to decrease GLV from green beans daily to 3-4x /week.  2. Repeat INR in two weeks, 1/2.  3. Verbal information provided over the phone. Eliz Ortega RBV dosing instructions, expresses understanding by teach back, and has no further questions at this time.  4. Patient declines the need for  warfarin refills at this time.    Jennifer Childress, PharmD  12/17/2018  1:51 PM

## 2019-01-10 ENCOUNTER — ANTICOAGULATION VISIT (OUTPATIENT)
Dept: PHARMACY | Facility: HOSPITAL | Age: 59
End: 2019-01-10

## 2019-01-10 DIAGNOSIS — I48.20 CHRONIC ATRIAL FIBRILLATION (HCC): ICD-10-CM

## 2019-01-10 LAB — INR PPP: 2.31

## 2019-01-10 NOTE — PROGRESS NOTES
Anticoagulation Clinic - Remote Progress Note  REMOTE LAB    Indication: chronic afib  Referring Provider: Laura  Initial Warfarin Start Date:   Goal INR: 2.0-3.0  Current Drug Interactions: aspirin, ranitidine  CHADS-VASc: 1 (HTN)    Diet: GLV: varies, may have salad 2-3x a week or sometimes nothing for the month  Alcohol: none  Tobacco: 1 can of dip/smokeless tobacco a day  OTC Pain Medication: Ibuprofen or APAP -- advised to minimize ibuprofen use, if at all, and to use APAP whenever possible    INR History:  Date 8/23 9/26 10/24 11/22 12/19 1/23/18 2/21 3/20 4/24 5/21 6/20 7/24 8/7   Total Weekly Dose 70mg 70mg 70mg 70mg 70mg 70mg 70mg 70mg 70mg 70mg 70mg 70mg 70mg   INR 2.9 2.3 2.4 2.9 2.8 2.4 2.5 2.3 2.9 2.1 2.9 3.1 2.1   Notes                  Date 8/21 9/25 10/22 11/20 12/17 1/10/19         Total Weekly Dose 70mg 70mg 80mg??? 70mg 70mg 70mg         INR 2.3 2.4 3.1 3.03 1.89 2.31         Notes   extra dose?  incr GLV 1x boost           Phone Interview:  Tablet Strength: 10mg tablets  Patient Contact Info: [phone] or [phone] (Wife, Geni)  Lab Contact Info: [facility] [phone]    Plan:  1. INR is therapeutic and back WNL today at 2.31. Instructed Mr. Ortega to continue 10mg daily.  2. Repeat INR in two weeks, 1/24.  3. Verbal information provided over the phone. Eliz Ortega RBV dosing instructions, expresses understanding by teach back, and has no further questions at this time.  4. Patient declines the need for warfarin refills at this time.    Fredrick Rosales, Zoila  1/10/2019  3:17 PM    IJessica, [facility], have reviewed the note in full and agree with the assessment and plan.  01/11/19  8:30 AM

## 2019-01-11 ENCOUNTER — TELEPHONE (OUTPATIENT)
Dept: PHARMACY | Facility: HOSPITAL | Age: 59
End: 2019-01-11

## 2019-01-11 DIAGNOSIS — I48.91 ATRIAL FIBRILLATION, UNSPECIFIED TYPE (HCC): Primary | ICD-10-CM

## 2019-02-01 ENCOUNTER — ANTICOAGULATION VISIT (OUTPATIENT)
Dept: PHARMACY | Facility: HOSPITAL | Age: 59
End: 2019-02-01

## 2019-02-01 DIAGNOSIS — I48.20 CHRONIC ATRIAL FIBRILLATION (HCC): ICD-10-CM

## 2019-02-01 LAB — INR PPP: 2.14

## 2019-02-01 NOTE — PROGRESS NOTES
Anticoagulation Clinic - Remote Progress Note  REMOTE LAB    Indication: chronic afib  Referring Provider: Laura  Initial Warfarin Start Date:   Goal INR: 2.0-3.0  Current Drug Interactions: aspirin, ranitidine  CHADS-VASc: 1 (HTN)    Diet: GLV: varies, may have salad 2-3x a week or sometimes nothing for the month  Alcohol: none  Tobacco: 1 can of dip/smokeless tobacco a day  OTC Pain Medication: Ibuprofen or APAP -- advised to minimize ibuprofen use, if at all, and to use APAP whenever possible    INR History:  Date 8/23 9/26 10/24 11/22 12/19 1/23/18 2/21 3/20 4/24 5/21 6/20 7/24 8/7   Total Weekly Dose 70mg 70mg 70mg 70mg 70mg 70mg 70mg 70mg 70mg 70mg 70mg 70mg 70mg   INR 2.9 2.3 2.4 2.9 2.8 2.4 2.5 2.3 2.9 2.1 2.9 3.1 2.1   Notes                  Date 8/21 9/25 10/22 11/20 12/17 1/10/19 2/1        Total Weekly Dose 70mg 70mg 80mg??? 70mg 70mg 70mg 70mg        INR 2.3 2.4 3.1 3.03 1.89 2.31 2.14        Notes   extra dose?  incr GLV 1x boost           Phone Interview:  Tablet Strength: 10mg tablets  Patient Contact Info: [phone] or [phone] (Wife, Geni)  Lab Contact Info: [facility] [phone]    Patient Findings:  Negatives:  Signs/symptoms of thrombosis, Signs/symptoms of bleeding, Laboratory test error suspected, Change in health, Change in alcohol use, Change in activity, Upcoming invasive procedure, Emergency department visit, Upcoming dental procedure, Missed doses, Extra doses, Change in medications, Change in diet/appetite, Hospital admission, Bruising, Other complaints   Comments:  Of note, did not speak with patient until 2/4. LVM on 2/1 to continue maintenance dose through the weekend. Patient continued taking his scheduled dose over the weekend.     Plan:  1. INR is therapeutic today. Instructed Mr. Ortega to continue warfarin 10mg oral daily.  2. Repeat INR in 4 weeks.  3. Verbal information provided over the phone. Eliz Ortega RBV dosing instructions, expresses  understanding by teach back, and has no further questions at this time.  4. Patient declines the need for warfarin refills at this time.    Melissa Sanchez, Zoila  2/1/2019  2:54 PM    I, Maricarmen Grider, PharmD, have reviewed the note in full and agree with the assessment and plan.  02/05/19  4:08 PM

## 2019-03-05 LAB — INR PPP: 2.21

## 2019-03-06 ENCOUNTER — ANTICOAGULATION VISIT (OUTPATIENT)
Dept: PHARMACY | Facility: HOSPITAL | Age: 59
End: 2019-03-06

## 2019-03-06 DIAGNOSIS — I48.20 CHRONIC ATRIAL FIBRILLATION (HCC): ICD-10-CM

## 2019-03-06 NOTE — PROGRESS NOTES
Anticoagulation Clinic - Remote Progress Note  REMOTE LAB    Indication: chronic afib  Referring Provider: Laura  Initial Warfarin Start Date:   Goal INR: 2.0-3.0  Current Drug Interactions: aspirin, ranitidine  CHADS-VASc: 1 (HTN)    Diet: GLV: varies, may have salad 2-3x a week or sometimes nothing for the month  Alcohol: none  Tobacco: 1 can of dip/smokeless tobacco a day  OTC Pain Medication: Ibuprofen or APAP -- advised to minimize ibuprofen use, if at all, and to use APAP whenever possible    INR History:  Date 8/23 9/26 10/24 11/22 12/19 1/23/18 2/21 3/20 4/24 5/21 6/20 7/24 8/7   Total Weekly Dose 70mg 70mg 70mg 70mg 70mg 70mg 70mg 70mg 70mg 70mg 70mg 70mg 70mg   INR 2.9 2.3 2.4 2.9 2.8 2.4 2.5 2.3 2.9 2.1 2.9 3.1 2.1   Notes                  Date 8/21 9/25 10/22 11/20 12/17 1/10/19 2/1 3/5       Total Weekly Dose 70mg 70mg 80mg??? 70mg 70mg 70mg 70mg 70mg       INR 2.3 2.4 3.1 3.03 1.89 2.31 2.14 2.21       Notes   extra dose?  incr GLV 1x boost           Phone Interview:  Tablet Strength: 10mg tablets  Patient Contact Info: [phone] or [phone] (Wife, Geni)  Lab Contact Info: [facility] [phone]    Patient Findings:  Negatives:  Signs/symptoms of thrombosis, Signs/symptoms of bleeding, Laboratory test error suspected, Change in health, Change in alcohol use, Change in activity, Upcoming invasive procedure, Emergency department visit, Upcoming dental procedure, Missed doses, Extra doses, Change in medications, Change in diet/appetite, Hospital admission, Bruising, Other complaints     Plan:  1. INR is therapeutic today. Instructed Mr. Ortega to continue warfarin 10mg daily.  2. Repeat INR in 4 weeks.  3. Verbal information provided over the phone. Eliz Ortega RBV dosing instructions, expresses understanding by teach back, and has no further questions at this time.  4. Patient declines the need for warfarin refills at this time.    Melissa Sanchez CPhT  3/6/2019  8:22 AM      I, Ilana Giraldo, [facility], have reviewed the note in full and agree with the assessment and plan.  03/06/19  10:40 AM

## 2019-04-02 ENCOUNTER — ANTICOAGULATION VISIT (OUTPATIENT)
Dept: PHARMACY | Facility: HOSPITAL | Age: 59
End: 2019-04-02

## 2019-04-02 DIAGNOSIS — I48.20 CHRONIC ATRIAL FIBRILLATION (HCC): ICD-10-CM

## 2019-04-02 LAB — INR PPP: 1.8

## 2019-04-02 NOTE — PROGRESS NOTES
Anticoagulation Clinic - Remote Progress Note  REMOTE LAB    Indication: chronic afib  Referring Provider: Laura  Initial Warfarin Start Date:   Goal INR: 2.0-3.0  Current Drug Interactions: aspirin, ranitidine  CHADS-VASc: 1 (HTN)    Diet: GLV: varies, may have salad 2-3x a week or sometimes nothing for the month  Alcohol: none  Tobacco: 1 can of dip/smokeless tobacco a day  OTC Pain Medication: Ibuprofen or APAP -- advised to minimize ibuprofen use, if at all, and to use APAP whenever possible    INR History:  Date 8/23 9/26 10/24 11/22 12/19 1/23/18 2/21 3/20 4/24 5/21 6/20 7/24 8/7   Total Weekly Dose 70mg 70mg 70mg 70mg 70mg 70mg 70mg 70mg 70mg 70mg 70mg 70mg 70mg   INR 2.9 2.3 2.4 2.9 2.8 2.4 2.5 2.3 2.9 2.1 2.9 3.1 2.1   Notes                  Date 8/21 9/25 10/22 11/20 12/17 1/10/19 2/1 3/5 4/2      Total Weekly Dose 70mg 70mg 80mg??? 70mg 70mg 70mg 70mg 70mg 70mg      INR 2.3 2.4 3.1 3.03 1.89 2.31 2.14 2.21 1.80      Notes   extra dose?  incr GLV 1x boost           Phone Interview:  Tablet Strength: 10mg tablets  Patient Contact Info: [phone] or [phone] (Wife, Geni)  Lab Contact Info: [facility] [phone]    Patient Findings:  Positives:  Change in medications   Negatives:  Signs/symptoms of thrombosis, Signs/symptoms of bleeding, Laboratory test error suspected, Change in health, Change in alcohol use, Change in activity, Upcoming invasive procedure, Emergency department visit, Upcoming dental procedure, Missed doses, Extra doses, Change in diet/appetite, Hospital admission, Bruising, Other complaints   Comments:  Patient reports he took a medication for allergies for a week but cannot recall which medication it was. No other changes were reported.     Plan:  1. INR is subtherapeutic 4/2 at 1.80. Instructed Mr. Ortega to boost dose tonight (4/3) to warfarin 15mg then continue warfarin 10mg oral daily.  2. Repeat INR in 2 weeks.  3. Verbal information provided over the phone.  Eliz A Northern RBV dosing instructions, expresses understanding by teach back, and has no further questions at this time.  4. Patient requests a refill on his warfarin.    Grabiel Michel, PharmD Candidate  4/2/2019  2:28 PM     Refills sent to [facility].   I, Ilana Giraldo, [facility], have reviewed the note in full and agree with the assessment and plan.  04/03/19  12:03 PM

## 2019-04-03 RX ORDER — WARFARIN SODIUM 10 MG/1
TABLET ORAL
Qty: 90 TABLET | Refills: 0 | Status: SHIPPED | OUTPATIENT
Start: 2019-04-03 | End: 2019-05-15

## 2019-04-15 ENCOUNTER — ANTICOAGULATION VISIT (OUTPATIENT)
Dept: PHARMACY | Facility: HOSPITAL | Age: 59
End: 2019-04-15

## 2019-04-15 DIAGNOSIS — I48.20 CHRONIC ATRIAL FIBRILLATION (HCC): ICD-10-CM

## 2019-04-15 LAB — INR PPP: 2.34

## 2019-04-15 NOTE — PROGRESS NOTES
Anticoagulation Clinic - Remote Progress Note  REMOTE LAB    Indication: chronic afib  Referring Provider: Laura  Initial Warfarin Start Date:   Goal INR: 2.0-3.0  Current Drug Interactions: aspirin, ranitidine  CHADS-VASc: 1 (HTN)    Diet: GLV: varies, may have green beans 2-3x a week or sometimes nothing for the month  Alcohol: none  Tobacco: 1 can of dip/smokeless tobacco a day  OTC Pain Medication: Ibuprofen or APAP -- advised to minimize ibuprofen use, if at all, and to use APAP whenever possible    INR History:  Date 8/23 9/26 10/24 11/22 12/19 1/23/18 2/21 3/20 4/24 5/21 6/20 7/24 8/7   Total Weekly Dose 70mg 70mg 70mg 70mg 70mg 70mg 70mg 70mg 70mg 70mg 70mg 70mg 70mg   INR 2.9 2.3 2.4 2.9 2.8 2.4 2.5 2.3 2.9 2.1 2.9 3.1 2.1   Notes                  Date 8/21 9/25 10/22 11/20 12/17 1/10/19 2/1 3/5 4/2 4/15     Total Weekly Dose 70mg 70mg 80mg??? 70mg 70mg 70mg 70mg 70mg 70mg 70mg     INR 2.3 2.4 3.1 3.03 1.89 2.31 2.14 2.21 1.80 2.34     Notes   extra dose?  incr GLV 1x boost           Phone Interview:  Tablet Strength: 10mg tablets  Patient Contact Info: [phone] or [phone] (Wife, Geni)  Lab Contact Info: [facility] - [phone]    Patient Findings:  Negatives:  Signs/symptoms of thrombosis, Signs/symptoms of bleeding, Laboratory test error suspected, Change in health, Change in alcohol use, Change in activity, Upcoming invasive procedure, Emergency department visit, Upcoming dental procedure, Missed doses, Extra doses, Change in medications, Change in diet/appetite, Hospital admission, Bruising, Other complaints     Plan:  1. INR is back WNL today at 2.34. Instructed Mr. Ortega to continue warfarin 10mg oral daily.  2. Repeat INR in 4 weeks.  3. Verbal information provided over the phone. Eliz A Northern RBV dosing instructions, expresses understanding by teach back, and has no further questions at this time.  4. Patient requests a refill on his warfarin.    Melissa Sanchez,  CP  4/15/2019  3:10 PM     Maricarmen MARTIN, [facility], have reviewed the note in full and agree with the assessment and plan.  04/16/19  4:01 PM

## 2019-05-14 ENCOUNTER — ANTICOAGULATION VISIT (OUTPATIENT)
Dept: PHARMACY | Facility: HOSPITAL | Age: 59
End: 2019-05-14

## 2019-05-14 DIAGNOSIS — I48.20 CHRONIC ATRIAL FIBRILLATION (HCC): ICD-10-CM

## 2019-05-14 LAB — INR PPP: 2.54

## 2019-05-14 NOTE — PROGRESS NOTES
Anticoagulation Clinic - Remote Progress Note  REMOTE LAB    Indication: chronic afib  Referring Provider: Laura  Initial Warfarin Start Date:   Goal INR: 2.0-3.0  Current Drug Interactions: aspirin, ranitidine  CHADS-VASc: 1 (HTN)    Diet: GLV: green beans occasionally (5/15/19)  Alcohol: none  Tobacco: 1 can of dip/smokeless tobacco a day  OTC Pain Medication: Ibuprofen or APAP -- advised to minimize ibuprofen use, if at all, and to use APAP whenever possible  Med list updated 5/15/19    INR History:  Date 8/23 9/26 10/24 11/22 12/19 1/23/18 2/21 3/20 4/24 5/21 6/20 7/24 8/7   Total Weekly Dose 70mg 70mg 70mg 70mg 70mg 70mg 70mg 70mg 70mg 70mg 70mg 70mg 70mg   INR 2.9 2.3 2.4 2.9 2.8 2.4 2.5 2.3 2.9 2.1 2.9 3.1 2.1   Notes                  Date 8/21 9/25 10/22 11/20 12/17 1/10/19 2/1 3/5 4/2 4/15 5/14    Total Weekly Dose 70mg 70mg 80mg??? 70mg 70mg 70mg 70mg 70mg 70mg 70mg 70mg    INR 2.3 2.4 3.1 3.03 1.89 2.31 2.14 2.21 1.80 2.34 2.54    Notes   extra dose?  incr GLV 1x boost           Phone Interview:  Tablet Strength: 10mg tablets  Patient Contact Info: [phone] or [phone] (Wife, Geni)  Lab Contact Info: [facility] [phone]    Patient Findings:  Negatives:  Signs/symptoms of thrombosis, Signs/symptoms of bleeding, Laboratory test error suspected, Change in health, Change in alcohol use, Change in activity, Upcoming invasive procedure, Emergency department visit, Upcoming dental procedure, Missed doses, Extra doses, Change in medications, Change in diet/appetite, Hospital admission, Bruising, Other complaints   Comments:  Reconciled home medications with patient. He denies any changes since last encounter or any signs/symptoms of bruising or bleeding.     Plan:  1. INR is therapeutic today at 2.54. Instructed Mr. Ortega to continue warfarin 10mg oral daily.  2. Repeat INR in 4 weeks on 6/11.  3. Verbal information provided over the phone. Eliz SMART Jordan RBV dosing instructions,  expresses understanding by teach back, and has no further questions at this time.    Melissa Sanchez, Zoila  5/14/2019  2:12 PM     I, Maricarmen Grider, PharmD, have reviewed the note in full and agree with the assessment and plan.  05/15/19  11:15 AM

## 2019-06-13 ENCOUNTER — ANTICOAGULATION VISIT (OUTPATIENT)
Dept: PHARMACY | Facility: HOSPITAL | Age: 59
End: 2019-06-13

## 2019-06-13 DIAGNOSIS — I48.20 CHRONIC ATRIAL FIBRILLATION (HCC): ICD-10-CM

## 2019-06-13 LAB — INR PPP: 2.87

## 2019-06-13 NOTE — PROGRESS NOTES
Anticoagulation Clinic - Remote Progress Note  REMOTE LAB    Indication: chronic afib  Referring Provider: Laura  Initial Warfarin Start Date:   Goal INR: 2.0-3.0  Current Drug Interactions: aspirin, ranitidine  CHADS-VASc: 1 (HTN)    Diet: GLV: green beans occasionally (5/15/19)  Alcohol: none  Tobacco: 1 can of dip/smokeless tobacco a day  OTC Pain Medication: Ibuprofen or APAP -- advised to minimize ibuprofen use, if at all, and to use APAP whenever possible  Med list updated 5/15/19    INR History:  Date 8/23 9/26 10/24 11/22 12/19 1/23/18 2/21 3/20 4/24 5/21 6/20 7/24 8/7   Total Weekly Dose 70mg 70mg 70mg 70mg 70mg 70mg 70mg 70mg 70mg 70mg 70mg 70mg 70mg   INR 2.9 2.3 2.4 2.9 2.8 2.4 2.5 2.3 2.9 2.1 2.9 3.1 2.1   Notes                  Date 8/21 9/25 10/22 11/20 12/17 1/10/19 2/1 3/5 4/2 4/15 5/14 6/13   Total Weekly Dose 70mg 70mg 80mg??? 70mg 70mg 70mg 70mg 70mg 70mg 70mg 70mg 70mg   INR 2.3 2.4 3.1 3.03 1.89 2.31 2.14 2.21 1.80 2.34 2.54 2.87   Notes   extra dose?  incr GLV 1x boost           Phone Interview:  Tablet Strength: 10mg tablets  Patient Contact Info: [phone] or [phone] (Wife, Gein)  Lab Contact Info: [facility] [phone]    Patient Findings:  Negatives:  Signs/symptoms of thrombosis, Signs/symptoms of bleeding, Laboratory test error suspected, Change in health, Change in alcohol use, Change in activity, Upcoming invasive procedure, Emergency department visit, Upcoming dental procedure, Missed doses, Extra doses, Change in medications, Change in diet/appetite, Hospital admission, Bruising, Other complaints     Plan:  1. INR is therapeutic today. Instructed Mr. Ortega to continue warfarin 10mg daily.  2. Repeat INR in 4 weeks.  3. Verbal information provided over the phone. Eliz SMART [place] RBV dosing instructions, expresses understanding by teach back, and has no further questions at this time.    Melissa Sanchez, Zoila  6/13/2019  11:40 AM       IFredrick  [facility], have reviewed the note in full and agree with the assessment and plan.  06/13/19  12:16 PM

## 2019-07-15 ENCOUNTER — ANTICOAGULATION VISIT (OUTPATIENT)
Dept: PHARMACY | Facility: HOSPITAL | Age: 59
End: 2019-07-15

## 2019-07-15 DIAGNOSIS — I48.20 CHRONIC ATRIAL FIBRILLATION (HCC): ICD-10-CM

## 2019-07-15 LAB — INR PPP: 2.87

## 2019-07-15 RX ORDER — LEVOTHYROXINE SODIUM 0.05 MG/1
50 TABLET ORAL DAILY
COMMUNITY
Start: 2019-06-14

## 2019-07-15 NOTE — PROGRESS NOTES
Anticoagulation Clinic - Remote Progress Note  REMOTE LAB    Indication: chronic afib  Referring Provider: Laura  Goal INR: 2.0-3.0  Current Drug Interactions: aspirin, ranitidine, levothyroxine  CHADS-VASc: 1 (HTN)    Diet: GLV: green beans occasionally (5/15/19)  Alcohol: none  Tobacco: 1 can of dip/smokeless tobacco a day  OTC Pain Medication: Ibuprofen or APAP -- advised to minimize ibuprofen use, if at all, and to use APAP whenever possible  Med list updated 5/15/19    INR History:  Date 8/23 9/26 10/24 11/22 12/19 1/23/18 2/21 3/20 4/24 5/21 6/20 7/24 8/7   Total Weekly Dose 70mg 70mg 70mg 70mg 70mg 70mg 70mg 70mg 70mg 70mg 70mg 70mg 70mg   INR 2.9 2.3 2.4 2.9 2.8 2.4 2.5 2.3 2.9 2.1 2.9 3.1 2.1   Notes                  Date 8/21 9/25 10/22 11/20 12/17 1/10/19 2/1 3/5 4/2 4/15 5/14 6/13   Total Weekly Dose 70mg 70mg 80mg??? 70mg 70mg 70mg 70mg 70mg 70mg 70mg 70mg 70mg   INR 2.3 2.4 3.1 3.03 1.89 2.31 2.14 2.21 1.80 2.34 2.54 2.87   Notes   extra dose?  incr GLV 1x boost           Date 7/15              Total Weekly Dose 70mg              INR 2.87              Notes levothyrox start                Phone Interview:  Tablet Strength: 10mg tablets  Patient Contact Info: [phone] or [phone] (Wife, Geni)  Lab Contact Info: [facility] [phone]    Patient Findings   Positives:  Change in medications   Negatives:  Signs/symptoms of thrombosis, Signs/symptoms of bleeding, Laboratory test error suspected, Change in health, Change in alcohol use, Change in activity, Upcoming invasive procedure, Emergency department visit, Upcoming dental procedure, Missed doses, Extra doses, Change in diet/appetite, Hospital admission, Bruising, Other complaints   Comments:  Patient reports starting levothyroxine 50mcg 1 tab QD approx a month ago. Denies any abnormal bruising or bleeding. Have spoken with patient about s/sx that he should be aware of and what to do if he does have any abnormal bleeds.     Per  [employer]:   Warning: Concurrent use of ORAL ANTICOAGULANTS and THYROID HORMONES may result in an increased risk of bleeding.   Clinical Management: In patients receiving oral anticoagulant therapy, the prothrombin time or international normalized ratio (INR) may need to be monitored with the addition and withdrawal of treatment with thyroid hormones (Yara, 1980). However, in evidence from studies, additional monitoring may not be necessary (Manny et al, 2014; Ac et al, 2012). Patients already stabilized on thyroid hormone products, such that they are euthyroid, will respond normally to the introduction of anticoagulant therapy and no special precautions are necessary (Hansten, 1980).     Plan:  1. INR is therapeutic today. Instructed Mr. Ortega to continue warfarin 10mg daily.  2. Repeat INR in 4 weeks.  3. Verbal information provided over the phone. Eliz Ortega RBV dosing instructions, expresses understanding by teach back, and has no further questions at this time.  4. Med list reviewed and updated. Ta requested.    Fredrick Rosales CPhT  7/15/2019  12:20 PM    Ta entered.  I, Jessica Recio, [facility], have reviewed the note in full and agree with the assessment and plan.  07/15/19  4:00 PM

## 2019-08-13 DIAGNOSIS — E78.2 MIXED HYPERLIPIDEMIA: Primary | ICD-10-CM

## 2019-08-14 NOTE — PROGRESS NOTES
[facility] at [facility]  Follow Up Visit  Eliz Ortega  1960    VISIT DATE:  08/15/19    PCP:   Anthony Mukherjee MD  [address] [place] [address]      CC: Follow-up for CAD and atrial fibrillation    Problem List:  1. Coronary artery disease:  a. 01/08/2015: Lateral STEMI with emergent 3.0 x 18 Xience KENZIE to first diagonal, non-obstructive disease  otherwise. EF 45%.   b. Echocardiogram at [facility] 2015 showed ejection fraction of 50% with normal cardiac valves mild mitral regurgitation and mild tricuspid regurgitation  2. Atrial fibrillation, new onset, unclear duration:   a. Cardioversion x2, 01/08/2015 and 01/09/2015, with conversion to sinus mechanism.  Initiation of sotalol  and warfarin.   b. 02/05/2015: Documentation of recurrent atrial fibrillation, unknown duration, asymptomatic.  Committed to rate control anticoagulation.   3. Dyslipidemia.  Lipids from June 2019 reviewed showed a total cholesterol 108, triglycerides of 108, HDL of 41, and LDL 45.  Chemistry was within normal limits  4. Borderline hypertension.   5. Hypothyroid on replacement last TSH elevated at 5.7           History of Present Illness:  Eliz Ortega  Is a 58 y.o. male with pertinent cardiac history detailed above.  He is returning for one-year follow-up, previously had followed with Dr. Sanchez.  At last visit he was doing well without complaints.  He continues to feel well today with no chest pain no dyspnea no palpitations.  Follows with our anticoagulation clinic for warfarin checks and levels have been therapeutic.  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  Recent labs reviewed showed good lipid control.      Patient Active Problem List    Diagnosis Date Noted   • Chronic atrial fibrillation (CMS/HCC) 07/24/2018   • Essential hypertension 09/07/2017   • Mixed hyperlipidemia 09/07/2017   • Coronary artery disease      Note Last Updated: 9/30/2016      1. Coronary artery disease:  a. 01/08/2015: Lateral STEMI with emergent 3.0 x 18 Xience KENZIE to first diagonal, non-obstructive disease otherwise. EF 45%.        • A-fib (CMS/HCC)      Note Last Updated: 8/4/2017     2. Atrial fibrillation, new onset, unclear duration:   a. Cardioversion x2, 01/08/2015 and 01/09/2015, with conversion to sinus mechanism.  Initiation of sotalol and warfarin.   b. 02/05/2015: Documentation of recurrent atrial fibrillation, unknown duration, asymptomatic.  Committed to rate control anticoagulation.        • Dyslipidemia    • BP (high blood pressure)      Note Last Updated: 8/4/2017     Borderline hypertension.         Allergies   Allergen Reactions   • Penicillins Hives       Social History     Socioeconomic History   • Marital status: Single     Spouse name: Not on file   • Number of children: Not on file   • Years of education: Not on file   • Highest education level: Not on file   Occupational History     Employer: [employer]   Tobacco Use   • Smoking status: Never Smoker   • Smokeless tobacco: Former User     Types: Snuff   Substance and Sexual Activity   • Alcohol use: No   • Drug use: No   • Sexual activity: Defer       Family History   Problem Relation Age of Onset   • Hypertension Mother    • Heart attack Mother    • No Known Problems Father        Current Medications:    Current Outpatient Medications:   •  aspirin 81 MG EC tablet, Take 81 mg by mouth Daily., Disp: , Rfl:   •  atorvastatin (LIPITOR) 20 MG tablet, Take 20 mg by mouth Every Night., Disp: , Rfl:   •  bisoprolol (ZEBeta) 5 MG tablet, Take 5 mg by mouth Daily., Disp: , Rfl:   •  levothyroxine (SYNTHROID, LEVOTHROID) 50 MCG tablet, Take 50 mcg by mouth Daily., Disp: , Rfl:   •  losartan (COZAAR) 12.5 MG half tablet, Take 12.5 mg by mouth Daily., Disp: , Rfl:   •  nitroglycerin (NITROSTAT) 0.4 MG SL tablet, Place 0.4 mg under the tongue Every 5 (Five) Minutes As Needed., Disp: , Rfl:   •  ranitidine (ZANTAC) 150 MG  "tablet, Take 150 mg by mouth Daily., Disp: , Rfl:   •  warfarin (COUMADIN) 10 MG tablet, Take 1 tablet by mouth daily, or as directed by the anticoagulation clinic., Disp: 90 tablet, Rfl: 1     Review of Systems   Cardiovascular: Negative for chest pain, dyspnea on exertion, irregular heartbeat, leg swelling, palpitations and paroxysmal nocturnal dyspnea.   Respiratory: Negative for cough.    All other systems reviewed and are negative.      Vitals:    08/15/19 0915   BP: 116/85   BP Location: Right arm   Patient Position: Sitting   Pulse: 89   SpO2: 98%   Weight: 90.7 kg (200 lb)   Height: 170.2 cm (67\")       Physical Exam   Constitutional: He is oriented to person, place, and time. He appears well-developed and well-nourished.   Neck: Neck supple. No JVD present.   Cardiovascular: Normal rate, normal heart sounds and intact distal pulses. An irregular rhythm present. Exam reveals no friction rub.   No murmur heard.  Pulmonary/Chest: Effort normal and breath sounds normal.   Abdominal: Soft.   Musculoskeletal: Normal range of motion. He exhibits no edema.   Neurological: He is alert and oriented to person, place, and time.   Skin: Skin is warm and dry.   Psychiatric: He has a normal mood and affect.       Diagnostic Data:  Procedures  Lab Results   Component Value Date    CHLPL 128 01/09/2015    TRIG 108 01/09/2015    HDL 37 (L) 01/09/2015     Lab Results   Component Value Date    GLUCOSE 94 01/09/2015    BUN 15 01/09/2015    CREATININE 0.7 01/09/2015     01/09/2015    K 3.9 01/09/2015     01/09/2015    CO2 23 01/09/2015     Lab Results   Component Value Date    HGBA1C 5.4 01/09/2015     Lab Results   Component Value Date    WBC 9.36 01/09/2015    HGB 13.9 01/09/2015    HCT 42.5 01/09/2015     01/09/2015       Assessment:   Diagnosis Plan   1. Coronary artery disease involving native coronary artery of native heart without angina pectoris     2. Persistent atrial fibrillation (CMS/HCC)   "       Plan:      1.  CAD with prior lateral STEMI  -Continue aspirin, atorvastatin, bisoprolol  -Lipids at goal  -Currently asymptomatic    2.  Persistent atrial fibrillation.  -Patient is currently under a rate control and anticoagulation strategy   -Controlled on bisoprolol  -He is on warfarin and follows with anticoagulation clinic for INR checks has been staying between 2 and 3  -Symptomatic    3.  HTN  -Controlled on current medication    4.  Hypothyroid  -Followed by his PCP    Doing well currently stable.  Follow-up 6 months      Felipe Romero MD

## 2019-08-15 ENCOUNTER — ANTICOAGULATION VISIT (OUTPATIENT)
Dept: PHARMACY | Facility: HOSPITAL | Age: 59
End: 2019-08-15

## 2019-08-15 ENCOUNTER — OFFICE VISIT (OUTPATIENT)
Dept: CARDIOLOGY | Facility: CLINIC | Age: 59
End: 2019-08-15

## 2019-08-15 VITALS
DIASTOLIC BLOOD PRESSURE: 85 MMHG | SYSTOLIC BLOOD PRESSURE: 116 MMHG | OXYGEN SATURATION: 98 % | BODY MASS INDEX: 31.39 KG/M2 | HEIGHT: 67 IN | HEART RATE: 89 BPM | WEIGHT: 200 LBS

## 2019-08-15 DIAGNOSIS — I48.20 CHRONIC ATRIAL FIBRILLATION (HCC): ICD-10-CM

## 2019-08-15 DIAGNOSIS — I25.10 CORONARY ARTERY DISEASE INVOLVING NATIVE CORONARY ARTERY OF NATIVE HEART WITHOUT ANGINA PECTORIS: Primary | ICD-10-CM

## 2019-08-15 DIAGNOSIS — I48.19 PERSISTENT ATRIAL FIBRILLATION (HCC): ICD-10-CM

## 2019-08-15 LAB — INR PPP: 3.15

## 2019-08-15 PROCEDURE — 99213 OFFICE O/P EST LOW 20 MIN: CPT | Performed by: INTERNAL MEDICINE

## 2019-08-15 NOTE — PROGRESS NOTES
Anticoagulation Clinic - Remote Progress Note  REMOTE LAB    Indication: chronic afib  Referring Provider: Laura  Goal INR: 2.0-3.0  Current Drug Interactions: aspirin, ranitidine, levothyroxine  CHADS-VASc: 1 (HTN)    Diet: GLV: green beans occasionally (5/15/19)  Alcohol: none  Tobacco: 1 can of dip/smokeless tobacco a day  OTC Pain Medication: Ibuprofen or APAP -- advised to minimize ibuprofen use, if at all, and to use APAP whenever possible  Med list updated 5/15/19    INR History:  Date 8/23 9/26 10/24 11/22 12/19 1/23/18 2/21 3/20 4/24 5/21 6/20 7/24 8/7   Total Weekly Dose 70mg 70mg 70mg 70mg 70mg 70mg 70mg 70mg 70mg 70mg 70mg 70mg 70mg   INR 2.9 2.3 2.4 2.9 2.8 2.4 2.5 2.3 2.9 2.1 2.9 3.1 2.1   Notes                  Date 8/21 9/25 10/22 11/20 12/17 1/10/19 2/1 3/5 4/2 4/15 5/14 6/13   Total Weekly Dose 70mg 70mg 80mg??? 70mg 70mg 70mg 70mg 70mg 70mg 70mg 70mg 70mg   INR 2.3 2.4 3.1 3.03 1.89 2.31 2.14 2.21 1.80 2.34 2.54 2.87   Notes   extra dose?  incr GLV 1x boost           Date 7/15 8/15             Total Weekly Dose 70mg 70mg             INR 2.87 3.15             Notes levothyrox start                Phone Interview:  Tablet Strength: 10mg tablets  Patient Contact Info: [phone] or [phone] (Wife, Geni)  Lab Contact Info: [facility] [phone]    Patient Findings:  Negatives:  Signs/symptoms of thrombosis, Signs/symptoms of bleeding, Laboratory test error suspected, Change in health, Change in alcohol use, Change in activity, Upcoming invasive procedure, Emergency department visit, Upcoming dental procedure, Missed doses, Extra doses, Change in medications, Change in diet/appetite, Hospital admission, Bruising, Other complaints   Comments:  Patient reports he has not ate any GLV recently. He continues Levothyroxine.     Plan:  1. INR is therapeutic today. Instructed Mr. Ortega to continue warfarin 10mg daily and eat an additional serving of GLV.  2. Repeat INR in two weeks.  3.  Verbal information provided over the phone. Eliz SMART Jordan RBV dosing instructions, expresses understanding by teach back, and has no further questions at this time.  4. Med list reviewed and updated. iVent requested.    Melissa Sanchez CP  8/15/2019  11:24 AM     IMercedes, [facility], have reviewed the note in full and agree with the assessment and plan.  08/15/19  12:03 PM

## 2019-09-13 ENCOUNTER — ANTICOAGULATION VISIT (OUTPATIENT)
Dept: PHARMACY | Facility: HOSPITAL | Age: 59
End: 2019-09-13

## 2019-09-13 DIAGNOSIS — I48.20 CHRONIC ATRIAL FIBRILLATION (HCC): ICD-10-CM

## 2019-09-13 LAB — INR PPP: 2.41

## 2019-09-13 NOTE — PROGRESS NOTES
Anticoagulation Clinic - Remote Progress Note  REMOTE LAB    Indication: chronic afib  Referring Provider: Laura  Goal INR: 2.0-3.0  Current Drug Interactions: aspirin, ranitidine, levothyroxine  CHADS-VASc: 1 (HTN)    Diet: GLV: green beans occasionally (5/15/19)  Alcohol: none  Tobacco: 1 can of dip/smokeless tobacco a day  OTC Pain Medication: Ibuprofen or APAP -- advised to minimize ibuprofen use, if at all, and to use APAP whenever possible  Med list updated 5/15/19    INR History:  Date 8/23 9/26 10/24 11/22 12/19 1/23/18 2/21 3/20 4/24 5/21 6/20 7/24 8/7   Total Weekly Dose 70mg 70mg 70mg 70mg 70mg 70mg 70mg 70mg 70mg 70mg 70mg 70mg 70mg   INR 2.9 2.3 2.4 2.9 2.8 2.4 2.5 2.3 2.9 2.1 2.9 3.1 2.1   Notes                  Date 8/21 9/25 10/22 11/20 12/17 1/10/19 2/1 3/5 4/2 4/15 5/14 6/13   Total Weekly Dose 70mg 70mg 80mg??? 70mg 70mg 70mg 70mg 70mg 70mg 70mg 70mg 70mg   INR 2.3 2.4 3.1 3.03 1.89 2.31 2.14 2.21 1.80 2.34 2.54 2.87   Notes   extra dose?  incr GLV 1x boost           Date 7/15 8/15 9/11            Total Weekly Dose 70mg 70mg 70mg            INR 2.87 3.15 2.41            Notes levothyrox start                Phone Interview:  Tablet Strength: 10mg tablets  Patient Contact Info: [phone] or [phone] (Wife, Geni)  Lab Contact Info: [facility] [phone]    Patient Findings   Negatives:  Signs/symptoms of thrombosis, Signs/symptoms of bleeding, Laboratory test error suspected, Change in health, Change in alcohol use, Change in activity, Upcoming invasive procedure, Emergency department visit, Upcoming dental procedure, Missed doses, Extra doses, Change in medications, Change in diet/appetite, Hospital admission, Bruising, Other complaints     Plan:  1. INR is therapeutic today at 2.41. Patient was non-compliant with requested follow-up date. Instructed Mr. Ortega to continue warfarin 10mg daily  2. Repeat INR in 4 weeks, 10/10  3. Verbal information provided over the phone.  Eliz SMART Northern RBV dosing instructions, expresses understanding by teach back, and has no further questions at this time.    Fredrick Rosales CPhT  9/13/2019  9:33 AM      I, Debby Maynard [facility], have reviewed the note in full and agree with the assessment and plan.  09/13/19  10:01 AM

## 2019-10-15 ENCOUNTER — ANTICOAGULATION VISIT (OUTPATIENT)
Dept: PHARMACY | Facility: HOSPITAL | Age: 59
End: 2019-10-15

## 2019-10-15 DIAGNOSIS — I48.20 CHRONIC ATRIAL FIBRILLATION (HCC): ICD-10-CM

## 2019-10-15 LAB — INR PPP: 2.71

## 2019-10-15 NOTE — PROGRESS NOTES
Anticoagulation Clinic - Remote Progress Note  REMOTE LAB    Indication: chronic afib  Referring Provider: Laura  Goal INR: 2.0-3.0  Current Drug Interactions: aspirin, ranitidine, levothyroxine  CHADS-VASc: 1 (HTN)    Diet: GLV: green beans occasionally (10/16/19)  Alcohol: none  Tobacco: 1 can of dip/smokeless tobacco a day  OTC Pain Medication: Ibuprofen or APAP -- advised to minimize ibuprofen use, if at all, and to use APAP whenever possible  Med list updated 5/15/19    INR History:  Date 8/23 9/26 10/24 11/22 12/19 1/23/18 2/21 3/20 4/24 5/21 6/20 7/24 8/7   Total Weekly Dose 70mg 70mg 70mg 70mg 70mg 70mg 70mg 70mg 70mg 70mg 70mg 70mg 70mg   INR 2.9 2.3 2.4 2.9 2.8 2.4 2.5 2.3 2.9 2.1 2.9 3.1 2.1   Notes                  Date 8/21 9/25 10/22 11/20 12/17 1/10/19 2/1 3/5 4/2 4/15 5/14 6/13   Total Weekly Dose 70mg 70mg 80mg??? 70mg 70mg 70mg 70mg 70mg 70mg 70mg 70mg 70mg   INR 2.3 2.4 3.1 3.03 1.89 2.31 2.14 2.21 1.80 2.34 2.54 2.87   Notes   extra dose?  incr GLV 1x boost           Date 7/15 8/15 9/11 10/15           Total Weekly Dose 70mg 70mg 70mg 70mg           INR 2.87 3.15 2.41 2.71           Notes levothyrox start                Phone Interview:  Tablet Strength: 10mg tablets  Patient Contact Info: [phone] or [phone] (Wife, Geni)  Lab Contact Info: [facility] [phone]    Patient Findings:  Negatives:  Signs/symptoms of thrombosis, Signs/symptoms of bleeding, Laboratory test error suspected, Change in health, Change in alcohol use, Change in activity, Upcoming invasive procedure, Emergency department visit, Upcoming dental procedure, Missed doses, Extra doses, Change in medications, Change in diet/appetite, Hospital admission, Bruising, Other complaints     Plan:  1. INR is therapeutic today at 2.71. Instructed Mr. Ortega to continue warfarin 10mg oral daily.  2. Repeat INR in ~ 4 weeks, Monday 11/18. Patient will be out of town the week of 11/10-11/16.  3. Verbal information  provided over the phone. Eliz BING Ortega RBV dosing instructions, expresses understanding by teach back, and has no further questions at this time.  4. Will request new referral from Dr. Romero.     Melissa Sanchez, BOSSMAN  10/15/2019  1:55 PM    I, Maricarmen Grider, PharmD, have reviewed the note in full and agree with the assessment and plan.  10/16/19  11:03 AM

## 2019-11-13 ENCOUNTER — ANTICOAGULATION VISIT (OUTPATIENT)
Dept: PHARMACY | Facility: HOSPITAL | Age: 59
End: 2019-11-13

## 2019-11-13 DIAGNOSIS — I48.20 CHRONIC ATRIAL FIBRILLATION (HCC): ICD-10-CM

## 2019-11-13 LAB — INR PPP: 2.6

## 2019-11-13 NOTE — PROGRESS NOTES
Anticoagulation Clinic - Remote Progress Note  REMOTE LAB    Indication: chronic afib  Referring Provider: Heather (Last seen: 8/15/19  next appt: 2/20/20)  Goal INR: 2.0-3.0  Current Drug Interactions: aspirin, ranitidine, levothyroxine  CHADS-VASc: 1 (HTN)    Diet: GLV: green beans occasionally (10/16/19)  Alcohol: none  Tobacco: 1 can of dip/smokeless tobacco a day  OTC Pain Medication: Ibuprofen or APAP -- advised to minimize ibuprofen use, if at all, and to use APAP whenever possible  Med list updated 5/15/19    INR History:  Date 8/23 9/26 10/24 11/22 12/19 1/23/18 2/21 3/20 4/24 5/21 6/20 7/24 8/7   Total Weekly Dose 70mg 70mg 70mg 70mg 70mg 70mg 70mg 70mg 70mg 70mg 70mg 70mg 70mg   INR 2.9 2.3 2.4 2.9 2.8 2.4 2.5 2.3 2.9 2.1 2.9 3.1 2.1   Notes                  Date 8/21 9/25 10/22 11/20 12/17 1/10/19 2/1 3/5 4/2 4/15 5/14 6/13   Total Weekly Dose 70mg 70mg 80mg??? 70mg 70mg 70mg 70mg 70mg 70mg 70mg 70mg 70mg   INR 2.3 2.4 3.1 3.03 1.89 2.31 2.14 2.21 1.80 2.34 2.54 2.87   Notes   extra dose?  incr GLV 1x boost           Date 7/15 8/15 9/11 10/15 11/13          Total Weekly Dose 70mg 70mg 70mg 70mg 70mg          INR 2.87 3.15 2.41 2.71 2.60          Notes levothyrox start                Phone Interview:  Tablet Strength: 10mg tablets  Patient Contact Info: [phone] or [phone] (Wife, Geni)  Lab Contact Info: [facility] [phone]    Patient Findings   Negatives:  Signs/symptoms of thrombosis, Signs/symptoms of bleeding, Laboratory test error suspected, Change in health, Change in alcohol use, Change in activity, Upcoming invasive procedure, Emergency department visit, Upcoming dental procedure, Missed doses, Extra doses, Change in medications, Change in diet/appetite, Hospital admission, Bruising, Other complaints     Plan:  1. INR is therapeutic today at 2.6. Instructed patient's spouse, Geni, to have patient continue warfarin 10mg oral daily.  2. Repeat INR in 4 weeks, 12/11   3.  Verbal information provided over the phone. Eliz Ortega's spouse, Geni, RBV dosing instructions, expresses understanding by teach back, and has no further questions at this time.    Fredrick Rosales, BOSSMAN  11/13/2019  2:34 PM    IJm [facility], have reviewed the note in full and agree with the assessment and plan.  11/13/19  3:43 PM

## 2019-12-10 ENCOUNTER — ANTICOAGULATION VISIT (OUTPATIENT)
Dept: PHARMACY | Facility: HOSPITAL | Age: 59
End: 2019-12-10

## 2019-12-10 DIAGNOSIS — I48.20 CHRONIC ATRIAL FIBRILLATION (HCC): ICD-10-CM

## 2019-12-10 LAB — INR PPP: 2.26

## 2019-12-10 NOTE — PROGRESS NOTES
Anticoagulation Clinic - Remote Progress Note  REMOTE LAB    Indication: chronic afib  Referring Provider: Heather (Last seen: 8/15/19  next appt: 2/20/20)  Goal INR: 2.0-3.0  Current Drug Interactions: aspirin, ranitidine, levothyroxine  CHADS-VASc: 1 (HTN)    Diet: GLV: green beans occasionally (12/12/19)  Alcohol: none  Tobacco: 1 can of dip/smokeless tobacco a day  OTC Pain Medication: Ibuprofen or APAP -- advised to minimize ibuprofen use, if at all, and to use APAP whenever possible  Med list updated 5/15/19    INR History:  Date 8/23 9/26 10/24 11/22 12/19 1/23/18 2/21 3/20 4/24 5/21 6/20 7/24 8/7   Total Weekly Dose 70mg 70mg 70mg 70mg 70mg 70mg 70mg 70mg 70mg 70mg 70mg 70mg 70mg   INR 2.9 2.3 2.4 2.9 2.8 2.4 2.5 2.3 2.9 2.1 2.9 3.1 2.1   Notes                  Date 8/21 9/25 10/22 11/20 12/17 1/10/19 2/1 3/5 4/2 4/15 5/14 6/13   Total Weekly Dose 70mg 70mg 80mg??? 70mg 70mg 70mg 70mg 70mg 70mg 70mg 70mg 70mg   INR 2.3 2.4 3.1 3.03 1.89 2.31 2.14 2.21 1.80 2.34 2.54 2.87   Notes   extra dose?  incr GLV 1x boost           Date 7/15 8/15 9/11 10/15 11/13 12/10         Total Weekly Dose 70mg 70mg 70mg 70mg 70mg 70mg         INR 2.87 3.15 2.41 2.71 2.60 2.26         Notes levothyrox start                Phone Interview:  Tablet Strength: 10mg tablets  Patient Contact Info: [phone] or [phone] (WifeGeni)  Lab Contact Info: [facility] [phone]  Verbal Release Authorization: On 12/12/19 patient gave permission via phone to speak with wifeGeni or daughterDianne about results.    Patient Findings:  Negatives:  Signs/symptoms of thrombosis, Signs/symptoms of bleeding, Laboratory test error suspected, Change in health, Change in alcohol use, Change in activity, Upcoming invasive procedure, Emergency department visit, Upcoming dental procedure, Missed doses, Extra doses, Change in medications, Change in diet/appetite, Hospital admission, Bruising, Other complaints     Plan:  1. INR is  therapeutic today at 2.26. Instructed patient to continue warfarin 10 mg oral daily.  2. Repeat INR in 4 weeks on 1/7/2020.   3. Verbal information provided over the phone. Eliz SMART [place] RBV dosing instructions, expresses understanding by teach back, and has no further questions at this time.  4. Patient requests a new standing order be placed to [facility] as his current order is expiring.    Melissa Sanchez CPhT  12/10/2019  3:44 PM     I, Maricarmen Grider, [facility], have reviewed the note in full and agree with the assessment and plan.  Standing order entered as requested.  12/12/19  3:04 PM

## 2019-12-12 DIAGNOSIS — I48.20 CHRONIC ATRIAL FIBRILLATION (HCC): Primary | ICD-10-CM

## 2020-01-09 ENCOUNTER — ANTICOAGULATION VISIT (OUTPATIENT)
Dept: PHARMACY | Facility: HOSPITAL | Age: 60
End: 2020-01-09

## 2020-01-09 DIAGNOSIS — I48.20 CHRONIC ATRIAL FIBRILLATION (HCC): ICD-10-CM

## 2020-01-09 LAB — INR PPP: 3.68

## 2020-01-09 NOTE — PROGRESS NOTES
Anticoagulation Clinic - Remote Progress Note  REMOTE LAB    Indication: chronic afib  Referring Provider: Heather (Last seen: 8/15/19  next appt: 2/20/20)  Goal INR: 2.0-3.0  Current Drug Interactions: aspirin, ranitidine, levothyroxine  CHADS-VASc: 1 (HTN)    Diet: GLV: green beans occasionally (12/12/19)  Alcohol: none  Tobacco: 1 can of dip/smokeless tobacco a day  OTC Pain Medication: Ibuprofen or APAP -- advised to minimize ibuprofen use, if at all, and to use APAP whenever possible  Med list updated 5/15/19    INR History:  Date 8/23 9/26 10/24 11/22 12/19 1/23/18 2/21 3/20 4/24 5/21 6/20 7/24 8/7   Total Weekly Dose 70mg 70mg 70mg 70mg 70mg 70mg 70mg 70mg 70mg 70mg 70mg 70mg 70mg   INR 2.9 2.3 2.4 2.9 2.8 2.4 2.5 2.3 2.9 2.1 2.9 3.1 2.1   Notes                  Date 8/21 9/25 10/22 11/20 12/17 1/10/19 2/1 3/5 4/2 4/15 5/14 6/13   Total Weekly Dose 70mg 70mg 80mg??? 70mg 70mg 70mg 70mg 70mg 70mg 70mg 70mg 70mg   INR 2.3 2.4 3.1 3.03 1.89 2.31 2.14 2.21 1.80 2.34 2.54 2.87   Notes   extra dose?  incr GLV 1x boost           Date 7/15 8/15 9/11 10/15 11/13 12/10 1/9        Total Weekly Dose 70mg 70mg 70mg 70mg 70mg 70mg 70 mg        INR 2.87 3.15 2.41 2.71 2.60 2.26 3.68        Notes levothyrox start      sick          Phone Interview:  Tablet Strength: 10mg tablets  Patient Contact Info: [phone] or [phone] (Wife, Geni)  Lab Contact Info: [facility] [phone]  Verbal Release Authorization: On 12/12/19 patient gave permission via phone to speak with wife, Geni or daughterDianne about results.    Patient Findings     Negatives:  Signs/symptoms of thrombosis, Signs/symptoms of bleeding, Laboratory test error suspected, Change in health, Change in alcohol use, Change in activity, Upcoming invasive procedure, Emergency department visit, Upcoming dental procedure, Missed doses, Extra doses, Change in medications, Change in diet/appetite, Hospital admission, Bruising, Other complaints    Comments:  Mr. Wellingotn hasn't been feeling well the past week or so.  He has been taking cold and flu relief daytime and nighttime (unsure of brand and hasn't been eating as much as he normally does.  He states there have been no medication changes and he takes an occasional APAP for pain.  Reinforced patient to avoid ibuprofen if possible.         Plan:  1. INR is supra- therapeutic today at 3.68.  As patient has been therapeutic for many months, suspect this is due to his recent illness and decreased appetite.  Denies use of any antibiotics.  Instructed patient to take 5 mg tonight and then continue warfarin 10 mg oral daily.  2. Repeat INR on 1/22.   3. Verbal information provided over the phone. Eliz [facility] RBV dosing instructions, expresses understanding by teach back, and has no further questions at this time.  4. Patient requests a new standing order be placed to [facility] as his current order is expiring.    Sara Sims, [place]  Pharmacy Resident  1/9/2020  12:19 PM

## 2020-01-21 ENCOUNTER — ANTICOAGULATION VISIT (OUTPATIENT)
Dept: PHARMACY | Facility: HOSPITAL | Age: 60
End: 2020-01-21

## 2020-01-21 DIAGNOSIS — I48.20 CHRONIC ATRIAL FIBRILLATION (HCC): ICD-10-CM

## 2020-01-21 LAB — INR PPP: 2.61

## 2020-01-21 NOTE — PROGRESS NOTES
Anticoagulation Clinic - Remote Progress Note  REMOTE LAB    Indication: chronic afib  Referring Provider: Heather (Last seen: 8/15/19  next appt: 2/20/20)  Goal INR: 2.0-3.0  Current Drug Interactions: aspirin, ranitidine, levothyroxine  CHADS-VASc: 1 (HTN)    Diet: GLV: green beans occasionally (12/12/19)  Alcohol: none  Tobacco: 1 can of dip/smokeless tobacco a day  OTC Pain Medication: Ibuprofen or APAP -- advised to minimize ibuprofen use, if at all, and to use APAP whenever possible  Med list updated 5/15/19    INR History:  Date 8/23 9/26 10/24 11/22 12/19 1/23/18 2/21 3/20 4/24 5/21 6/20 7/24 8/7   Total Weekly Dose 70mg 70mg 70mg 70mg 70mg 70mg 70mg 70mg 70mg 70mg 70mg 70mg 70mg   INR 2.9 2.3 2.4 2.9 2.8 2.4 2.5 2.3 2.9 2.1 2.9 3.1 2.1   Notes                  Date 8/21 9/25 10/22 11/20 12/17 1/10/19 2/1 3/5 4/2 4/15 5/14 6/13   Total Weekly Dose 70mg 70mg 80mg??? 70mg 70mg 70mg 70mg 70mg 70mg 70mg 70mg 70mg   INR 2.3 2.4 3.1 3.03 1.89 2.31 2.14 2.21 1.80 2.34 2.54 2.87   Notes   extra dose?  incr GLV 1x boost           Date 7/15 8/15 9/11 10/15 11/13 12/10 1/9/20 1/21       Total Weekly Dose 70mg 70mg 70mg 70mg 70mg 70mg 70 mg 70mg       INR 2.87 3.15 2.41 2.71 2.60 2.26 3.68 2.61       Notes levothyrox start      sick          Phone Interview:  Tablet Strength: 10mg tablets  Patient Contact Info: [phone] or [phone] (Wife, Geni)  Lab Contact Info: [facility] [phone]  Verbal Release Authorization: On 12/12/19 patient gave permission via phone to speak with wife, Geni or daughter, Dianne about results.    Patient Findings     Negatives:  Signs/symptoms of thrombosis, Signs/symptoms of bleeding, Laboratory test error suspected, Change in health, Change in alcohol use, Change in activity, Upcoming invasive procedure, Emergency department visit, Upcoming dental procedure, Missed doses, Extra doses, Change in medications, Change in diet/appetite, Hospital admission, Bruising, Other  complaints   Comments:  Mr. Ortega reports taking his warfarin as described in his last encounter. He denies changes in medications, vitamins/supplements or diet. Mr. Ortega claims to maintain his GLV intake.        Plan:  1. INR is back WNL today. Instructed patient to continue warfarin 10mg daily.  2. Repeat INR in 4 weeks.  3. Verbal information provided over the phone. Eliz Ortega RBV dosing instructions, expresses understanding by teach back, and has no further questions at this time.    Mikael Ortega PharmD  Pharmacy Resident   1/22/2020  9:38 AM

## 2020-02-12 ENCOUNTER — ANTICOAGULATION VISIT (OUTPATIENT)
Dept: PHARMACY | Facility: HOSPITAL | Age: 60
End: 2020-02-12

## 2020-02-12 DIAGNOSIS — I48.20 CHRONIC ATRIAL FIBRILLATION (HCC): ICD-10-CM

## 2020-02-12 LAB — INR PPP: 2.27

## 2020-02-12 NOTE — PROGRESS NOTES
Anticoagulation Clinic - Remote Progress Note  REMOTE LAB    Indication: chronic afib  Referring Provider: Heather (Last seen: 8/15/19  next appt: 2/20/20)  Goal INR: 2.0-3.0  Current Drug Interactions: aspirin, ranitidine, levothyroxine  CHADS-VASc: 1 (HTN)    Diet: GLV: green beans occasionally (12/12/19)  Alcohol: none  Tobacco: 1 can of dip/smokeless tobacco a day  OTC Pain Medication: Ibuprofen or APAP -- advised to minimize ibuprofen use, if at all, and to use APAP whenever possible  Med list updated 5/15/19    INR History:  Date 8/23 9/26 10/24 11/22 12/19 1/23/18 2/21 3/20 4/24 5/21 6/20 7/24 8/7   Total Weekly Dose 70mg 70mg 70mg 70mg 70mg 70mg 70mg 70mg 70mg 70mg 70mg 70mg 70mg   INR 2.9 2.3 2.4 2.9 2.8 2.4 2.5 2.3 2.9 2.1 2.9 3.1 2.1   Notes                  Date 8/21 9/25 10/22 11/20 12/17 1/10/19 2/1 3/5 4/2 4/15 5/14 6/13   Total Weekly Dose 70mg 70mg 80mg??? 70mg 70mg 70mg 70mg 70mg 70mg 70mg 70mg 70mg   INR 2.3 2.4 3.1 3.03 1.89 2.31 2.14 2.21 1.80 2.34 2.54 2.87   Notes   extra dose?  incr GLV 1x boost           Date 7/15 8/15 9/11 10/15 11/13 12/10 1/9/20 1/21 2/12      Total Weekly Dose 70mg 70mg 70mg 70mg 70mg 70mg 70mg 70mg 70mg      INR 2.87 3.15 2.41 2.71 2.60 2.26 3.68 2.61 2.27      Notes levothyrox start      sick          Phone Interview:  Tablet Strength: 10mg tablets  Patient Contact Info: [phone] or [phone] (Wife, Geni)  Lab Contact Info: [facility] [phone]  Verbal Release Authorization: On 12/12/19 patient gave permission via phone to speak with wife, Geni or daughterDianne about results.    Patient Findings   Negatives:  Signs/symptoms of thrombosis, Signs/symptoms of bleeding, Laboratory test error suspected, Change in health, Change in alcohol use, Change in activity, Upcoming invasive procedure, Emergency department visit, Upcoming dental procedure, Missed doses, Extra doses, Change in medications, Change in diet/appetite, Hospital admission,  Bruising, Other complaints     Plan:  1. INR is therapeutic today at 2.27. Instructed patient to continue warfarin 10mg daily.  2. Repeat INR in 4 weeks.  3. Verbal information provided over the phone. Eliz Ortega RBV dosing instructions, expresses understanding by teach back, and has no further questions at this time.    Fredrick Rosales, Zoila  2/12/2020  1:35 PM     I, Carlos Valdivia, [facility], have reviewed the note in full and agree with the assessment and plan.  02/13/20  11:15 AM

## 2020-02-20 ENCOUNTER — OFFICE VISIT (OUTPATIENT)
Dept: CARDIOLOGY | Facility: CLINIC | Age: 60
End: 2020-02-20

## 2020-02-20 VITALS
HEIGHT: 67 IN | OXYGEN SATURATION: 97 % | WEIGHT: 210.6 LBS | DIASTOLIC BLOOD PRESSURE: 86 MMHG | SYSTOLIC BLOOD PRESSURE: 123 MMHG | HEART RATE: 93 BPM | BODY MASS INDEX: 33.06 KG/M2

## 2020-02-20 DIAGNOSIS — I25.10 CORONARY ARTERY DISEASE INVOLVING NATIVE CORONARY ARTERY OF NATIVE HEART WITHOUT ANGINA PECTORIS: ICD-10-CM

## 2020-02-20 DIAGNOSIS — I48.21 PERMANENT ATRIAL FIBRILLATION (HCC): Primary | ICD-10-CM

## 2020-02-20 PROCEDURE — 93000 ELECTROCARDIOGRAM COMPLETE: CPT | Performed by: INTERNAL MEDICINE

## 2020-02-20 PROCEDURE — 99213 OFFICE O/P EST LOW 20 MIN: CPT | Performed by: INTERNAL MEDICINE

## 2020-02-20 RX ORDER — FAMOTIDINE 20 MG/1
20 TABLET, FILM COATED ORAL 2 TIMES DAILY
COMMUNITY
Start: 2020-01-24

## 2020-03-11 ENCOUNTER — ANTICOAGULATION VISIT (OUTPATIENT)
Dept: PHARMACY | Facility: HOSPITAL | Age: 60
End: 2020-03-11

## 2020-03-11 DIAGNOSIS — I48.20 CHRONIC ATRIAL FIBRILLATION (HCC): ICD-10-CM

## 2020-03-11 LAB — INR PPP: 2.65

## 2020-03-11 NOTE — PROGRESS NOTES
Anticoagulation Clinic - Remote Progress Note  REMOTE LAB    Indication: chronic afib  Referring Provider: Heather (Last seen: 8/15/19  next appt: 2/20/20)  Goal INR: 2.0-3.0  Current Drug Interactions: aspirin, ranitidine, levothyroxine  CHADS-VASc: 1 (HTN)    Diet: GLV: green beans occasionally (12/12/19)  Alcohol: none  Tobacco: 1 can of dip/smokeless tobacco a day  OTC Pain Medication: Ibuprofen or APAP -- advised to minimize ibuprofen use, if at all, and to use APAP whenever possible  Med list updated 5/15/19    INR History:  Date 8/23 9/26 10/24 11/22 12/19 1/23/18 2/21 3/20 4/24 5/21 6/20 7/24 8/7   Total Weekly Dose 70mg 70mg 70mg 70mg 70mg 70mg 70mg 70mg 70mg 70mg 70mg 70mg 70mg   INR 2.9 2.3 2.4 2.9 2.8 2.4 2.5 2.3 2.9 2.1 2.9 3.1 2.1   Notes                  Date 8/21 9/25 10/22 11/20 12/17 1/10/19 2/1 3/5 4/2 4/15 5/14 6/13   Total Weekly Dose 70mg 70mg 80mg??? 70mg 70mg 70mg 70mg 70mg 70mg 70mg 70mg 70mg   INR 2.3 2.4 3.1 3.03 1.89 2.31 2.14 2.21 1.80 2.34 2.54 2.87   Notes   extra dose?  incr GLV 1x boost           Date 7/15 8/15 9/11 10/15 11/13 12/10 1/9/20 1/21 2/12 3/11     Total Weekly Dose 70mg 70mg 70mg 70mg 70mg 70mg 70mg 70mg 70mg      INR 2.87 3.15 2.41 2.71 2.60 2.26 3.68 2.61 2.27 2.65     Notes levothyrox start      sick          Phone Interview:  Tablet Strength: 10mg tablets  Patient Contact Info: [phone] or [phone] (Wife, Geni)  Lab Contact Info: [facility] [phone]  Verbal Release Authorization: On 12/12/19 patient gave permission via phone to speak with wife, Geni or daughter, Dianne about results.    UNABLE TO GET IN CONTACT WITH THE PATIENT. PLEASE DISREGARD THE FOLLOWING PLAN UNTIL ABLE TO GET IN CONTACT WITH PATIENT / PATIENT REPRESENTATIVE.    LV 03/11/20 at 13:48      Plan:  1. INR is therapeutic today at 2.65. Instructed patient to continue warfarin 10mg daily.  2. Repeat INR in 4 weeks, 4/8  3. Verbal information provided over the phone. [facility]  [facility] RBV dosing instructions, expresses understanding by teach back, and has no further questions at this time.    Fredrick Rosales CPhT  3/11/2020  13:43

## 2020-03-12 NOTE — PROGRESS NOTES
Anticoagulation Clinic - Remote Progress Note  REMOTE LAB    Indication: chronic afib  Referring Provider: Heather (Last seen: 8/15/19  next appt: 2/20/20)  Goal INR: 2.0-3.0  Current Drug Interactions: aspirin, ranitidine, levothyroxine  CHADS-VASc: 1 (HTN)    Diet: GLV: green beans occasionally (12/12/19)  Alcohol: none  Tobacco: 1 can of dip/smokeless tobacco a day  OTC Pain Medication: Ibuprofen or APAP -- advised to minimize ibuprofen use, if at all, and to use APAP whenever possible  Med list updated 5/15/19    INR History:  Date 8/23 9/26 10/24 11/22 12/19 1/23/18 2/21 3/20 4/24 5/21 6/20 7/24 8/7   Total Weekly Dose 70mg 70mg 70mg 70mg 70mg 70mg 70mg 70mg 70mg 70mg 70mg 70mg 70mg   INR 2.9 2.3 2.4 2.9 2.8 2.4 2.5 2.3 2.9 2.1 2.9 3.1 2.1   Notes                  Date 8/21 9/25 10/22 11/20 12/17 1/10/19 2/1 3/5 4/2 4/15 5/14 6/13   Total Weekly Dose 70mg 70mg 80mg??? 70mg 70mg 70mg 70mg 70mg 70mg 70mg 70mg 70mg   INR 2.3 2.4 3.1 3.03 1.89 2.31 2.14 2.21 1.80 2.34 2.54 2.87   Notes   extra dose?  incr GLV 1x boost           Date 7/15 8/15 9/11 10/15 11/13 12/10 1/9/20 1/21 2/12 3/11     Total Weekly Dose 70mg 70mg 70mg 70mg 70mg 70mg 70mg 70mg 70mg 70mg     INR 2.87 3.15 2.41 2.71 2.60 2.26 3.68 2.61 2.27 2.65     Notes levothyrox start      sick   rec'd 3/12       Phone Interview:  Tablet Strength: 10mg tablets  Patient Contact Info: [phone] or [phone] (Wife, Geni)  Lab Contact Info: [facility] [phone]  Verbal Release Authorization: On 12/12/19 patient gave permission via phone to speak with wife, Geni or daughter, Dianne about results.    Patient Findings   Negatives:  Signs/symptoms of thrombosis, Signs/symptoms of bleeding, Laboratory test error suspected, Change in health, Change in alcohol use, Change in activity, Upcoming invasive procedure, Emergency department visit, Upcoming dental procedure, Missed doses, Extra doses, Change in medications, Change in diet/appetite,  Hospital admission, Bruising, Other complaints     Plan:  1. INR was therapeutic yesterday at 2.65. Instructed Mr. Ortega to continue warfarin 10mg oral daily.  2. Repeat INR in 4 weeks. 04/08/20.  3. Verbal information provided over the phone. Eliz Ortega RBV dosing instructions, expresses understanding by teach back, and has no further questions at this time.    Alyce Estevez, PharmD Candidate 2020   3/12/2020  09:35     I, Maricarmen Grider, PharmD, have reviewed the note in full and agree with the assessment and plan.  03/12/20  16:51

## 2020-04-16 ENCOUNTER — ANTICOAGULATION VISIT (OUTPATIENT)
Dept: PHARMACY | Facility: HOSPITAL | Age: 60
End: 2020-04-16

## 2020-04-16 DIAGNOSIS — I48.91 ATRIAL FIBRILLATION, UNSPECIFIED TYPE (HCC): ICD-10-CM

## 2020-04-16 DIAGNOSIS — I48.20 CHRONIC ATRIAL FIBRILLATION (HCC): ICD-10-CM

## 2020-04-16 LAB — INR PPP: 2.09

## 2020-04-16 RX ORDER — WARFARIN SODIUM 10 MG/1
TABLET ORAL
Qty: 90 TABLET | Refills: 0 | Status: SHIPPED | OUTPATIENT
Start: 2020-04-16 | End: 2020-07-08 | Stop reason: SDUPTHER

## 2020-04-16 NOTE — PROGRESS NOTES
Anticoagulation Clinic - Remote Progress Note  REMOTE LAB    Indication: chronic afib  Referring Provider: Heather (Last seen: 8/15/19  next appt: 2/20/20)  Goal INR: 2.0-3.0  Current Drug Interactions: aspirin, ranitidine, levothyroxine  CHADS-VASc: 1 (HTN)    Diet: GLV: green beans occasionally (4/16/20)  Alcohol: none  Tobacco: 1 can of dip/smokeless tobacco a day  OTC Pain Medication: Ibuprofen or APAP -- advised to minimize ibuprofen use, if at all, and to use APAP whenever possible  Med list updated 5/15/19    INR History:  Date 8/23 9/26 10/24 11/22 12/19 1/23/18 2/21 3/20 4/24 5/21 6/20 7/24 8/7   Total Weekly Dose 70mg 70mg 70mg 70mg 70mg 70mg 70mg 70mg 70mg 70mg 70mg 70mg 70mg   INR 2.9 2.3 2.4 2.9 2.8 2.4 2.5 2.3 2.9 2.1 2.9 3.1 2.1   Notes                  Date 8/21 9/25 10/22 11/20 12/17 1/10/19 2/1 3/5 4/2 4/15 5/14 6/13   Total Weekly Dose 70mg 70mg 80mg??? 70mg 70mg 70mg 70mg 70mg 70mg 70mg 70mg 70mg   INR 2.3 2.4 3.1 3.03 1.89 2.31 2.14 2.21 1.80 2.34 2.54 2.87   Notes   extra dose?  incr GLV 1x boost           Date 7/15 8/15 9/11 10/15 11/13 12/10 1/9/20 1/21 2/12 3/11 4/16    Total Weekly Dose 70mg 70mg 70mg 70mg 70mg 70mg 70mg 70mg 70mg 70mg 70mg    INR 2.87 3.15 2.41 2.71 2.60 2.26 3.68 2.61 2.27 2.65 2.09    Notes levothyrox start      sick   rec'd 3/12       Phone Interview:  Tablet Strength: 10mg tablets  Patient Contact Info: [phone] or [phone] (Wife, Geni)  Lab Contact Info: [facility] - [phone]  Verbal Release Authorization: On 12/12/19 patient gave permission via phone to speak with wife, Geni or daughterDianne about results.    Patient Findings   Negatives:  Signs/symptoms of thrombosis, Signs/symptoms of bleeding, Laboratory test error suspected, Change in health, Change in alcohol use, Change in activity, Upcoming invasive procedure, Emergency department visit, Upcoming dental procedure, Missed doses, Extra doses, Change in medications, Change in  diet/appetite, Hospital admission, Bruising, Other complaints     Plan:  1. INR is therapeutic again today. Instructed Geni, patient spouse, to have Mr. Ortega continue warfarin 10mg daily.  2. Repeat INR in 4 weeks.  3. Verbal information provided over the phone. Eliz Ortega's spouse, Geni, RBV dosing instructions, expresses understanding by teach back, and has no further questions at this time.  4. Patient's spouse requests refills of warfarin 10mg be sent to [facility] in [place], [place].    Melissa Sanchez, BOSSMAN  4/16/2020  14:27     I, Jennifer Childress, PharmD, have reviewed the note in full and agree with the assessment and plan.  04/16/20  16:25

## 2020-05-12 ENCOUNTER — ANTICOAGULATION VISIT (OUTPATIENT)
Dept: PHARMACY | Facility: HOSPITAL | Age: 60
End: 2020-05-12

## 2020-05-12 DIAGNOSIS — I48.20 CHRONIC ATRIAL FIBRILLATION (HCC): ICD-10-CM

## 2020-05-12 LAB — INR PPP: 2.64

## 2020-05-12 NOTE — PROGRESS NOTES
Anticoagulation Clinic - Remote Progress Note  REMOTE LAB    Indication: chronic afib  Referring Provider: Heather (Last seen: 2/20/20  next appt:3/4/2021)  Goal INR: 2.0-3.0  Current Drug Interactions: aspirin, ranitidine, levothyroxine  CHADS-VASc: 1 (HTN)    Diet: GLV: green beans occasionally (5/13/20)  Alcohol: none  Tobacco: 1 can of dip/smokeless tobacco a day  OTC Pain Medication: Ibuprofen or APAP -- advised to minimize ibuprofen use, if at all, and to use APAP whenever possible  Med list updated 5/15/19    INR History:  Date 8/23 9/26 10/24 11/22 12/19 1/23/18 2/21 3/20 4/24 5/21 6/20 7/24 8/7   Total Weekly Dose 70mg 70mg 70mg 70mg 70mg 70mg 70mg 70mg 70mg 70mg 70mg 70mg 70mg   INR 2.9 2.3 2.4 2.9 2.8 2.4 2.5 2.3 2.9 2.1 2.9 3.1 2.1   Notes                  Date 8/21 9/25 10/22 11/20 12/17 1/10/19 2/1 3/5 4/2 4/15 5/14 6/13   Total Weekly Dose 70mg 70mg 80mg??? 70mg 70mg 70mg 70mg 70mg 70mg 70mg 70mg 70mg   INR 2.3 2.4 3.1 3.03 1.89 2.31 2.14 2.21 1.80 2.34 2.54 2.87   Notes   extra dose?  incr GLV 1x boost           Date 7/15 8/15 9/11 10/15 11/13 12/10 1/9/20 1/21 2/12 3/11 4/16 5/12   Total Weekly Dose 70mg 70mg 70mg 70mg 70mg 70mg 70mg 70mg 70mg 70mg 70mg 70mg   INR 2.87 3.15 2.41 2.71 2.60 2.26 3.68 2.61 2.27 2.65 2.09 2.64   Notes levothyrox start      sick   rec'd 3/12       Phone Interview:  Tablet Strength: 10mg tablets  Patient Contact Info: [phone] or [phone] (Wife, Geni)  Lab Contact Info: [facility] [phone]  Verbal Release Authorization: On 12/12/19 patient gave permission via phone to speak with wife, Geni or daughterDianne about results.    Patient Findings:  Positives:  Change in diet/appetite   Negatives:  Signs/symptoms of thrombosis, Signs/symptoms of bleeding, Laboratory test error suspected, Change in health, Change in alcohol use, Change in activity, Upcoming invasive procedure, Emergency department visit, Upcoming dental procedure, Missed doses, Extra  doses, Change in medications, Hospital admission, Bruising, Other complaints   Comments:  Mr. Ortega ate some green beans on Sunday. He denies any other changes.     Plan:  1. INR is therapeutic again today. Instructed Geni, patient spouse, to have Mr. Ortega continue warfarin 10mg daily.  2. Repeat INR in 4 weeks.  3. Verbal information provided over the phone. Eliz Ortega's spouse, Geni, RBV dosing instructions, expresses understanding by teach back, and has no further questions at this time.    Melissa Sanchez, Zoila  5/12/2020  14:54     I, Jennifer Childress, PharmD, have reviewed the note in full and agree with the assessment and plan.  05/13/20  10:36

## 2020-06-16 ENCOUNTER — ANTICOAGULATION VISIT (OUTPATIENT)
Dept: PHARMACY | Facility: HOSPITAL | Age: 60
End: 2020-06-16

## 2020-06-16 DIAGNOSIS — I48.20 CHRONIC ATRIAL FIBRILLATION (HCC): ICD-10-CM

## 2020-06-16 LAB — INR PPP: 2.27

## 2020-06-16 NOTE — PROGRESS NOTES
Anticoagulation Clinic - Remote Progress Note  REMOTE LAB    Indication: chronic afib  Referring Provider: Heather (Last seen: 2/20/20  next appt:3/4/2021)  Goal INR: 2.0-3.0  Current Drug Interactions: aspirin, ranitidine, levothyroxine  CHADS-VASc: 1 (HTN)    Diet: GLV: green beans occasionally (5/13/20)  Alcohol: none  Tobacco: 1 can of dip/smokeless tobacco a day  OTC Pain Medication: Ibuprofen or APAP -- advised to minimize ibuprofen use, if at all, and to use APAP whenever possible  Med list updated 5/15/19    INR History:  Date 8/23 9/26 10/24 11/22 12/19 1/23/18 2/21 3/20 4/24 5/21 6/20 7/24 8/7   Total Weekly Dose 70mg 70mg 70mg 70mg 70mg 70mg 70mg 70mg 70mg 70mg 70mg 70mg 70mg   INR 2.9 2.3 2.4 2.9 2.8 2.4 2.5 2.3 2.9 2.1 2.9 3.1 2.1   Notes                  Date 8/21 9/25 10/22 11/20 12/17 1/10/19 2/1 3/5 4/2 4/15 5/14 6/13   Total Weekly Dose 70mg 70mg 80mg??? 70mg 70mg 70mg 70mg 70mg 70mg 70mg 70mg 70mg   INR 2.3 2.4 3.1 3.03 1.89 2.31 2.14 2.21 1.80 2.34 2.54 2.87   Notes   extra dose?  incr GLV 1x boost           Date 7/15 8/15 9/11 10/15 11/13 12/10 1/9/20 1/21 2/12 3/11 4/16 5/12   Total Weekly Dose 70mg 70mg 70mg 70mg 70mg 70mg 70mg 70mg 70mg 70mg 70mg 70mg   INR 2.87 3.15 2.41 2.71 2.60 2.26 3.68 2.61 2.27 2.65 2.09 2.64   Notes levothyrox start      sick   rec'd 3/12       Date 6/16         Total Weekly Dose 70mg         INR 2.27         Notes            Phone Interview:  Tablet Strength: 10mg tablets  Patient Contact Info: [phone] or [phone] **please speak with Geni or Dianne regarding results**  Lab [facility] [phone]  Verbal Release Authorization: On 12/12/19 patient gave permission via phone to speak with wife, Geni or daughterDinane about results.  **Patient prefers we do not ask interview questions if INR is in range - just verify dosing with daughter or spouse and give next repeat INR date**    Patient Findings:  Negatives:  Signs/symptoms of  "thrombosis, Signs/symptoms of bleeding, Laboratory test error suspected, Change in health, Change in alcohol use, Change in activity, Upcoming invasive procedure, Emergency department visit, Upcoming dental procedure, Missed doses, Extra doses, Change in medications, Change in diet/appetite, Hospital admission, Bruising, Other complaints   Comments:  Mr. Ortega did not want to be asked interview questions today. He states, \"nothing has changed\".     Plan:  1. INR is therapeutic again today. Instructed Geni, patient spouse, to have Mr. Ortega continue warfarin 10mg daily for now.  2. Repeat INR in 5 weeks. He will be on vacation around the 4 week analisa and will check when he returns.  3. Verbal information provided over the phone. Eliz Ortega's spouse, Geni, RBV dosing instructions, expresses understanding by teach back, and has no further questions at this time.  4. Going forward, Mr. Ortega requests the clinic does not ask him interview questions at each encounter. He agrees to call the clinic with any changes or upcoming surgeries/procedures. He also requests we talk to either Geni (spouse) or Dianne (daughter) when we call. They fill up his medication planner for him. He says it is ok for us to confirm dosing with Dianne or Geni and then give them next testing date. If out of range, Mr. Ortega was advised we will have to ask him interview questions.    Melissa Sanchez, Zoila  6/16/2020  15:21       I, Jennifer Childress, PharmD, have reviewed the note in full and agree with the assessment and plan.  06/19/20  16:40  "

## 2020-07-08 DIAGNOSIS — I48.91 ATRIAL FIBRILLATION, UNSPECIFIED TYPE (HCC): ICD-10-CM

## 2020-07-08 RX ORDER — WARFARIN SODIUM 10 MG/1
TABLET ORAL
Qty: 90 TABLET | Refills: 0 | Status: SHIPPED | OUTPATIENT
Start: 2020-07-08 | End: 2020-10-07 | Stop reason: SDUPTHER

## 2020-07-17 ENCOUNTER — ANTICOAGULATION VISIT (OUTPATIENT)
Dept: PHARMACY | Facility: HOSPITAL | Age: 60
End: 2020-07-17

## 2020-07-17 DIAGNOSIS — I48.20 CHRONIC ATRIAL FIBRILLATION (HCC): ICD-10-CM

## 2020-07-17 LAB — INR PPP: 2.24

## 2020-07-17 NOTE — PROGRESS NOTES
Anticoagulation Clinic - Remote Progress Note  REMOTE LAB    Indication: chronic afib  Referring Provider: Heather (Last seen: 2/20/20  next appt:3/4/2021)  Goal INR: 2.0-3.0  Current Drug Interactions: aspirin, ranitidine, levothyroxine  CHADS-VASc: 1 (HTN)    Diet: GLV: green beans occasionally (5/13/20)  Alcohol: none  Tobacco: 1 can of dip/smokeless tobacco a day  OTC Pain Medication: Ibuprofen or APAP -- advised to minimize ibuprofen use, if at all, and to use APAP whenever possible  Med list updated 5/15/19    INR History:  Date 8/23 9/26 10/24 11/22 12/19 1/23/18 2/21 3/20 4/24 5/21 6/20 7/24 8/7   Total Weekly Dose 70mg 70mg 70mg 70mg 70mg 70mg 70mg 70mg 70mg 70mg 70mg 70mg 70mg   INR 2.9 2.3 2.4 2.9 2.8 2.4 2.5 2.3 2.9 2.1 2.9 3.1 2.1   Notes                  Date 8/21 9/25 10/22 11/20 12/17 1/10/19 2/1 3/5 4/2 4/15 5/14 6/13   Total Weekly Dose 70mg 70mg 80mg??? 70mg 70mg 70mg 70mg 70mg 70mg 70mg 70mg 70mg   INR 2.3 2.4 3.1 3.03 1.89 2.31 2.14 2.21 1.80 2.34 2.54 2.87   Notes   extra dose?  incr GLV 1x boost           Date 7/15 8/15 9/11 10/15 11/13 12/10 1/9/20 1/21 2/12 3/11 4/16 5/12   Total Weekly Dose 70mg 70mg 70mg 70mg 70mg 70mg 70mg 70mg 70mg 70mg 70mg 70mg   INR 2.87 3.15 2.41 2.71 2.60 2.26 3.68 2.61 2.27 2.65 2.09 2.64   Notes levothyrox start      sick   rec'd 3/12       Date 6/16 7/17        Total Weekly Dose 70mg 70mg        INR 2.27 2.24        Notes            Phone Interview:  Tablet Strength: 10mg tablets  Patient Contact Info: [phone] or [phone] **please speak with Geni or Dianne regarding results**  Lab Contact Info: [facility] [phone]  Verbal Release Authorization: On 12/12/19 patient gave permission via phone to speak with wife, Geni or daughter, Dianne about results.  **Patient prefers we do not ask interview questions if INR is in range - just verify dosing with daughter or spouse and give next repeat INR date**    Patient Findings   Negatives:   Signs/symptoms of thrombosis, Signs/symptoms of bleeding, Laboratory test error suspected, Change in health, Change in alcohol use, Change in activity, Upcoming invasive procedure, Emergency department visit, Upcoming dental procedure, Missed doses, Extra doses, Change in medications, Change in diet/appetite, Hospital admission, Bruising, Other complaints     Plan:  1. INR is therapeutic again today. Instructed Geni, patient spouse, to have Mr. Ortega continue warfarin 10mg daily for now.  2. Repeat INR in 5 weeks, 8/21  3. Verbal information provided over the phone. Eliz Ortega's spouse, Geni, RBV dosing instructions, expresses understanding by teach back, and has no further questions at this time.  4. Going forward, Mr. Ortega requests the clinic does not ask him interview questions at each encounter. He agrees to call the clinic with any changes or upcoming surgeries/procedures. He also requests we talk to either Geni (spouse) or Dianne (daughter) when we call. They fill up his medication planner for him. He says it is ok for us to confirm dosing with Dianne or Geni and then give them next testing date. If out of range, Mr. Ortega was advised we will have to ask him interview questions.    Fredrick Rosales CPhT  7/17/2020  16:20     IMercedes, [facility], have reviewed the note in full and agree with the assessment and plan.  07/17/20  16:27

## 2020-08-19 ENCOUNTER — ANTICOAGULATION VISIT (OUTPATIENT)
Dept: PHARMACY | Facility: HOSPITAL | Age: 60
End: 2020-08-19

## 2020-08-19 DIAGNOSIS — I48.20 CHRONIC ATRIAL FIBRILLATION (HCC): ICD-10-CM

## 2020-08-19 LAB — INR PPP: 2.06

## 2020-08-19 NOTE — PROGRESS NOTES
Anticoagulation Clinic - Remote Progress Note  REMOTE LAB    Indication: chronic afib  Referring Provider: Heather (Last seen: 2/20/20  next appt:3/4/2021)  Goal INR: 2.0-3.0  Current Drug Interactions: aspirin, ranitidine, levothyroxine  CHADS-VASc: 1 (HTN)    Diet: GLV: green beans occasionally (5/13/20)  Alcohol: none  Tobacco: 1 can of dip/smokeless tobacco a day  OTC Pain Medication: Ibuprofen or APAP -- advised to minimize ibuprofen use, if at all, and to use APAP whenever possible  Med list updated 5/15/19    INR History:  Date 8/23 9/26 10/24 11/22 12/19 1/23/18 2/21 3/20 4/24 5/21 6/20 7/24 8/7   Total Weekly Dose 70mg 70mg 70mg 70mg 70mg 70mg 70mg 70mg 70mg 70mg 70mg 70mg 70mg   INR 2.9 2.3 2.4 2.9 2.8 2.4 2.5 2.3 2.9 2.1 2.9 3.1 2.1   Notes                  Date 8/21 9/25 10/22 11/20 12/17 1/10/19 2/1 3/5 4/2 4/15 5/14 6/13   Total Weekly Dose 70mg 70mg 80mg??? 70mg 70mg 70mg 70mg 70mg 70mg 70mg 70mg 70mg   INR 2.3 2.4 3.1 3.03 1.89 2.31 2.14 2.21 1.80 2.34 2.54 2.87   Notes   extra dose?  incr GLV 1x boost           Date 7/15 8/15 9/11 10/15 11/13 12/10 1/9/20 1/21 2/12 3/11 4/16 5/12 6/16 7/17 8/19   Total Weekly Dose 70mg 70mg 70mg 70mg 70mg 70mg 70mg 70mg 70mg 70mg 70mg 70mg 70 mg 70 mg 70 mg   INR 2.87 3.15 2.41 2.71 2.60 2.26 3.68 2.61 2.27 2.65 2.09 2.64 2.27 2.24 2.06   Notes levothyrox start      sick   rec'd 3/12          Date                  Total Weekly Dose                 INR                 Notes                   Phone Interview:  Tablet Strength: 10mg tablets  Patient Contact Info: [phone] or [phone] **please speak with Geni or Dianne regarding results**  Lab Contact Info: [facility] [phone]  Verbal Release Authorization: On 12/12/19 patient gave permission via phone to speak with wife, Geni or daughterSaritaDianne about results.  **Patient prefers we do not ask interview questions if INR is in range - just verify dosing with daughter or spouse and give next  repeat INR date**    Patient Findings   Negatives:  Signs/symptoms of thrombosis, Signs/symptoms of bleeding, Laboratory test error suspected, Change in health, Change in alcohol use, Change in activity, Upcoming invasive procedure, Emergency department visit, Upcoming dental procedure, Missed doses, Extra doses, Change in medications, Change in diet/appetite, Hospital admission, Bruising, Other complaints     Plan:  1. INR is therapeutic today at 2.06. Instructed Geni, patient spouse, to have Mr. Ortega continue warfarin 10mg oral daily for now.  2. Repeat INR in 5 weeks, 9/23  3. Verbal information provided over the phone. Eliz Ortega's spouse, Geni, RBV dosing instructions, expresses understanding by teach back, and has no further questions at this time.  4. Going forward, Mr. Ortega requests the clinic does not ask him interview questions at each encounter. He agrees to call the clinic with any changes or upcoming surgeries/procedures. He also requests we talk to either Geni (spouse) or Dianne (daughter) when we call. They fill up his medication planner for him. He says it is ok for us to confirm dosing with Dianne or Geni and then give them next testing date. If out of range, Mr. Ortega was advised we will have to ask him interview questions.    Fredrick Rosales, [place]  8/19/2020  14:28    I, Maricarmen Grider, PharmD, have reviewed the note in full and agree with the assessment and plan.  08/19/20  15:55

## 2020-09-24 ENCOUNTER — ANTICOAGULATION VISIT (OUTPATIENT)
Dept: PHARMACY | Facility: HOSPITAL | Age: 60
End: 2020-09-24

## 2020-09-24 DIAGNOSIS — I48.20 CHRONIC ATRIAL FIBRILLATION (HCC): ICD-10-CM

## 2020-09-24 LAB — INR PPP: 3.05

## 2020-09-24 NOTE — PROGRESS NOTES
Anticoagulation Clinic - Remote Progress Note  REMOTE LAB    Indication: chronic afib  Referring Provider: Heather (Last seen: 2/20/20  next appt:3/4/2021)  Goal INR: 2.0-3.0  Current Drug Interactions: aspirin, ranitidine, levothyroxine  CHADS-VASc: 1 (HTN)    Diet: GLV: green beans occasionally (5/13/20)  Alcohol: none  Tobacco: 1 can of dip/smokeless tobacco a day  OTC Pain Medication: Ibuprofen or APAP -- advised to minimize ibuprofen use, if at all, and to use APAP whenever possible  Med list updated 5/15/19    INR History:  Date 8/23 9/26 10/24 11/22 12/19 1/23/18 2/21 3/20 4/24 5/21 6/20 7/24 8/7   Total Weekly Dose 70mg 70mg 70mg 70mg 70mg 70mg 70mg 70mg 70mg 70mg 70mg 70mg 70mg   INR 2.9 2.3 2.4 2.9 2.8 2.4 2.5 2.3 2.9 2.1 2.9 3.1 2.1   Notes                  Date 8/21 9/25 10/22 11/20 12/17 1/10/19 2/1 3/5 4/2 4/15 5/14 6/13   Total Weekly Dose 70mg 70mg 80mg??? 70mg 70mg 70mg 70mg 70mg 70mg 70mg 70mg 70mg   INR 2.3 2.4 3.1 3.03 1.89 2.31 2.14 2.21 1.80 2.34 2.54 2.87   Notes   extra dose?  incr GLV 1x boost           Date 7/15 8/15 9/11 10/15 11/13 12/10 1/9/20 1/21 2/12 3/11 4/16 5/12 6/16 7/17 8/19   Total Weekly Dose 70mg 70mg 70mg 70mg 70mg 70mg 70mg 70mg 70mg 70mg 70mg 70mg 70 mg 70 mg 70 mg   INR 2.87 3.15 2.41 2.71 2.60 2.26 3.68 2.61 2.27 2.65 2.09 2.64 2.27 2.24 2.06   Notes levothyrox start      sick   rec'd 3/12          Date 9/22                Total Weekly Dose 70 mg                INR 3.05                Notes recv'd 9/24                  Phone Interview:  Tablet Strength: 10mg tablets  Patient Contact Info: [phone] or [phone] **please speak with Geni or Dianne regarding results**  Lab Contact Info: [facility] [phone]  Verbal Release Authorization: On 12/12/19 patient gave permission via phone to speak with wife, Geni or daughter, Dianne about results.  **Patient prefers we do not ask interview questions if INR is in range - just verify dosing with daughter or  spouse and give next repeat INR date**    Patient Findings:  Negatives:  Signs/symptoms of thrombosis, Signs/symptoms of bleeding, Laboratory test error suspected, Change in health, Change in alcohol use, Change in activity, Upcoming invasive procedure, Emergency department visit, Upcoming dental procedure, Missed doses, Extra doses, Change in medications, Change in diet/appetite, Hospital admission, Bruising, Other complaints   Comments:  Spoke with Mr. Ortega who denies any changes.     Plan:  1. INR was at [facility] on 9/22 but results not received by clinic until 9/24. Instructed Geni, patient spouse, to have Mr. Ortega continue warfarin 10mg daily for now. He will also eat a serving of green beans tonight.  2. Repeat INR in three weeks.  3. Verbal information provided over the phone. Eliz Ortega's spouse, Geni, RBV dosing instructions, expresses understanding by teach back, and has no further questions at this time.  4. Going forward, Mr. Ortega requests the clinic does not ask him interview questions at each encounter. He agrees to call the clinic with any changes or upcoming surgeries/procedures. He also requests we talk to either Geni (spouse) or Dianne (daughter) when we call. They fill up his medication planner for him. He says it is ok for us to confirm dosing with Dianne or Geni and then give them next testing date. If out of range, Mr. Ortega was advised we will have to ask him interview questions.    Melissa Sanchez, [place]  9/24/2020  08:58 EDT     I, Mercedes Alas, PharmD, have reviewed the note in full and agree with the assessment and plan.  09/25/20  10:36 EDT

## 2020-10-07 DIAGNOSIS — I48.91 ATRIAL FIBRILLATION, UNSPECIFIED TYPE (HCC): ICD-10-CM

## 2020-10-07 RX ORDER — WARFARIN SODIUM 10 MG/1
TABLET ORAL
Qty: 90 TABLET | Refills: 3 | Status: SHIPPED | OUTPATIENT
Start: 2020-10-07 | End: 2021-12-08 | Stop reason: SDUPTHER

## 2020-10-07 NOTE — TELEPHONE ENCOUNTER
Medication Refill Request    Medication: Warfarin 10 mg   Take 1 tablet by mouth daily or as directed    Pharmacy: [facility] [place], [place]

## 2020-10-21 ENCOUNTER — ANTICOAGULATION VISIT (OUTPATIENT)
Dept: PHARMACY | Facility: HOSPITAL | Age: 60
End: 2020-10-21

## 2020-10-21 DIAGNOSIS — I48.20 CHRONIC ATRIAL FIBRILLATION (HCC): ICD-10-CM

## 2020-10-21 LAB — INR PPP: 3.09

## 2020-10-21 NOTE — PROGRESS NOTES
Anticoagulation Clinic - Remote Progress Note  REMOTE LAB    Indication: chronic afib  Referring Provider: Heather (Last seen: 2/20/20  next appt:3/4/2021)  Goal INR: 2.0-3.0  Current Drug Interactions: aspirin, ranitidine, levothyroxine  CHADS-VASc: 1 (HTN)    Diet: GLV: green beans occasionally (5/13/20)  Alcohol: none  Tobacco: 1 can of dip/smokeless tobacco a day  OTC Pain Medication: Ibuprofen or APAP -- advised to minimize ibuprofen use, if at all, and to use APAP whenever possible  Med list updated 5/15/19    INR History:  Date 8/23 9/26 10/24 11/22 12/19 1/23/18 2/21 3/20 4/24 5/21 6/20 7/24 8/7   Total Weekly Dose 70mg 70mg 70mg 70mg 70mg 70mg 70mg 70mg 70mg 70mg 70mg 70mg 70mg   INR 2.9 2.3 2.4 2.9 2.8 2.4 2.5 2.3 2.9 2.1 2.9 3.1 2.1   Notes                  Date 8/21 9/25 10/22 11/20 12/17 1/10/19 2/1 3/5 4/2 4/15 5/14 6/13   Total Weekly Dose 70mg 70mg 80mg??? 70mg 70mg 70mg 70mg 70mg 70mg 70mg 70mg 70mg   INR 2.3 2.4 3.1 3.03 1.89 2.31 2.14 2.21 1.80 2.34 2.54 2.87   Notes   extra dose?  incr GLV 1x boost           Date 7/15 8/15 9/11 10/15 11/13 12/10 1/9/20 1/21 2/12 3/11 4/16 5/12 6/16 7/17 8/19   Total Weekly Dose 70mg 70mg 70mg 70mg 70mg 70mg 70mg 70mg 70mg 70mg 70mg 70mg 70 mg 70 mg 70 mg   INR 2.87 3.15 2.41 2.71 2.60 2.26 3.68 2.61 2.27 2.65 2.09 2.64 2.27 2.24 2.06   Notes levothyrox start      sick   rec'd 3/12          Date 9/22 10/20               Total Weekly Dose 70 mg 70mg               INR 3.05 3.09               Notes recv'd 9/24 rec 10/22                 Phone Interview:  Tablet Strength: 10mg tablets  Patient Contact Info: [phone] or [phone] **please speak with Geni or Dianne regarding results**  Lab Contact Info: [facility] [phone]  Verbal Release Authorization: On 12/12/19 patient gave permission via phone to speak with wife, Geni or daughter, Dianne about results.  **Patient prefers we do not ask interview questions if INR is in range - just verify  dosing with daughter or spouse and give next repeat INR date**    Patient Findings:    Negatives:  Signs/symptoms of thrombosis, Signs/symptoms of bleeding, Laboratory test error suspected, Change in health, Change in alcohol use, Change in activity, Upcoming invasive procedure, Emergency department visit, Upcoming dental procedure, Missed doses, Extra doses, Change in medications, Change in diet/appetite, Hospital admission, Bruising, Other complaints       Plan:  1. INR was at [facility] on 10/20 but results not received by clinic until 10/21. Instructed Geni, patient spouse, to have Mr. Ortega continue warfarin 10mg daily for now.Will try to incorporate green beans once weekly  2. Repeat INR in three weeks.  3. Verbal information provided over the phone. Eliz Ortega's spouse, Geni, RBV dosing instructions, expresses understanding by teach back, and has no further questions at this time.  4. Going forward, Mr. Ortega requests the clinic does not ask him interview questions at each encounter. He agrees to call the clinic with any changes or upcoming surgeries/procedures. He also requests we talk to either Geni (spouse) or Dianne (daughter) when we call. They fill up his medication planner for him. He says it is ok for us to confirm dosing with Dianne or Geni and then give them next testing date. If out of range, Mr. Ortega was advised we will have to ask him interview questions.    Mercedes Alas, PharmD.  10/22/20   15:51 EDT

## 2020-11-17 ENCOUNTER — ANTICOAGULATION VISIT (OUTPATIENT)
Dept: PHARMACY | Facility: HOSPITAL | Age: 60
End: 2020-11-17

## 2020-11-17 DIAGNOSIS — I48.20 CHRONIC ATRIAL FIBRILLATION (HCC): ICD-10-CM

## 2020-11-17 LAB — INR PPP: 2.7

## 2020-11-17 NOTE — PROGRESS NOTES
Anticoagulation Clinic - Remote Progress Note  REMOTE LAB    Indication: chronic afib  Referring Provider: Heather (Last seen: 2/20/20  next appt:3/4/2021)  Goal INR: 2.0-3.0  Current Drug Interactions: aspirin, ranitidine, levothyroxine  CHADS-VASc: 1 (HTN)    Diet: GLV: green beans occasionally (11/17/20)  Alcohol: none  Tobacco: 1 can of dip/smokeless tobacco a day  OTC Pain Medication: Ibuprofen or APAP -- advised to minimize ibuprofen use, if at all, and to use APAP whenever possible  Med list updated 5/15/19    INR History:  Date 8/23 9/26 10/24 11/22 12/19 1/23/18 2/21 3/20 4/24 5/21 6/20 7/24 8/7   Total Weekly Dose 70mg 70mg 70mg 70mg 70mg 70mg 70mg 70mg 70mg 70mg 70mg 70mg 70mg   INR 2.9 2.3 2.4 2.9 2.8 2.4 2.5 2.3 2.9 2.1 2.9 3.1 2.1   Notes                  Date 8/21 9/25 10/22 11/20 12/17 1/10/19 2/1 3/5 4/2 4/15 5/14 6/13   Total Weekly Dose 70mg 70mg 80mg??? 70mg 70mg 70mg 70mg 70mg 70mg 70mg 70mg 70mg   INR 2.3 2.4 3.1 3.03 1.89 2.31 2.14 2.21 1.80 2.34 2.54 2.87   Notes   extra dose?  incr GLV 1x boost           Date 7/15 8/15 9/11 10/15 11/13 12/10 1/9/20 1/21 2/12 3/11 4/16 5/12 6/16 7/17 8/19   Total Weekly Dose 70mg 70mg 70mg 70mg 70mg 70mg 70mg 70mg 70mg 70mg 70mg 70mg 70 mg 70 mg 70 mg   INR 2.87 3.15 2.41 2.71 2.60 2.26 3.68 2.61 2.27 2.65 2.09 2.64 2.27 2.24 2.06   Notes levothyrox start      sick   rec'd 3/12          Date 9/22 10/20 11/17              Total Weekly Dose 70 mg 70mg 70 mg              INR 3.05 3.09 2.70              Notes recv'd 9/24 rec 10/22                 Phone Interview:  Tablet Strength: 10mg tablets  Patient Contact Info: [phone] or [phone] **please speak with Geni or Dianne regarding results**  Lab Contact Info: [facility] [phone]  Verbal Release Authorization: On 12/12/19 patient gave permission via phone to speak with wife, Geni or daughter, Dianne about results.  **Patient prefers we do not ask interview questions if INR is in range  - just verify dosing with daughter or spouse and give next repeat INR date**    Patient Findings:  Negatives:  Signs/symptoms of thrombosis, Signs/symptoms of bleeding, Laboratory test error suspected, Change in health, Change in alcohol use, Change in activity, Upcoming invasive procedure, Emergency department visit, Upcoming dental procedure, Missed doses, Extra doses, Change in medications, Change in diet/appetite, Hospital admission, Bruising, Other complaints     Plan:  1. INR is therapeutic today. Instructed Dianne, patient daughter, to have Mr. Ortega continue maintenance dose of warfarin 10 mg daily for now.  2. Repeat INR in 4 weeks.  3. Verbal information provided over the phone. Eliz Ortega's daughter, Dianne, RBV dosing instructions, expresses understanding by teach back, and has no further questions at this time.  4. Going forward, Mr. Ortega requests the clinic does not ask him interview questions at each encounter. He agrees to call the clinic with any changes or upcoming surgeries/procedures. He also requests we talk to either Geni (spouse) or Dianne (daughter) when we call. They fill up his medication planner for him. He says it is ok for us to confirm dosing with Dianne or Geni and then give them next testing date. If out of range, Mr. Ortega was advised we will have to ask him interview questions.    Melissa Sanchez CPhT  11/17/20   14:18 Jennifer LOU, PharmD, have reviewed the note in full and agree with the assessment and plan.  11/17/20  16:36 EST

## 2020-12-17 ENCOUNTER — ANTICOAGULATION VISIT (OUTPATIENT)
Dept: PHARMACY | Facility: HOSPITAL | Age: 60
End: 2020-12-17

## 2020-12-17 DIAGNOSIS — I48.20 CHRONIC ATRIAL FIBRILLATION (HCC): ICD-10-CM

## 2020-12-17 LAB — INR PPP: 2.33

## 2020-12-17 NOTE — PROGRESS NOTES
Anticoagulation Clinic - Remote Progress Note  REMOTE LAB    Indication: chronic afib  Referring Provider: Heather (Last seen: 2/20/20  next appt:3/4/2021)  Goal INR: 2.0-3.0  Current Drug Interactions: aspirin, ranitidine, levothyroxine  CHADS-VASc: 1 (HTN)    Diet: GLV: green beans occasionally (11/17/20)  Alcohol: none  Tobacco: 1 can of dip/smokeless tobacco a day  OTC Pain Medication: Ibuprofen or APAP -- advised to minimize ibuprofen use, if at all, and to use APAP whenever possible  Med list updated 5/15/19    INR History:  Date 8/23 9/26 10/24 11/22 12/19 1/23/18 2/21 3/20 4/24 5/21 6/20 7/24   Total Weekly Dose 70mg 70mg 70mg 70mg 70mg 70mg 70mg 70mg 70mg 70mg 70mg 70mg   INR 2.9 2.3 2.4 2.9 2.8 2.4 2.5 2.3 2.9 2.1 2.9 3.1   Notes                 Date 8/7 8/21 9/25 10/22 11/20 12/17 1/10/19 2/1 3/5 4/2 4/15 5/14   Total Weekly Dose 70mg 70mg 70mg 80mg??? 70mg 70mg 70mg 70mg 70mg 70mg 70mg 70mg   INR 2.1 2.3 2.4 3.1 3.03 1.89 2.31 2.14 2.21 1.80 2.34 2.54   Notes    extra dose?  incr GLV 1x incr dose          Date 6/13 7/15 8/15 9/11 10/15 11/13 12/10 1/9/20 1/21 2/12 3/11 4/16   Total Weekly Dose 70mg 70mg 70mg 70mg 70mg 70mg 70mg 70mg 70mg 70mg 70mg 70mg   INR 2.87 2.87 3.15 2.41 2.71 2.60 2.26 3.68 2.61 2.27 2.65 2.09   Notes  levothyrox start      sick   recv'd 3/12      Date 5/12 6/16 7/17 8/19 9/22 10/20 11/17 12/15       Total Weekly Dose 70mg 70mg 70mg 70mg 70 mg 70mg 70mg 70mg       INR 2.64 2.27 2.24 2.06 3.05 3.09 2.70 2.33       Notes     recv'd 9/24 recv'd 10/22  recv'd 12/17         Phone Interview:  Tablet Strength: 10mg tablets  Patient Contact Info: [phone] or [phone] **please speak with Geni or Dianne regarding results**  Lab Contact Info: [facility] [phone]  Verbal Release Authorization: On 12/12/19 patient gave permission via phone to speak with wife, Geni or daughter, Dianne about results.  **Patient prefers we do not ask interview questions if INR is in  range - just verify dosing with daughter or spouse and give next repeat INR date**    Patient Findings  Negatives:  Signs/symptoms of thrombosis, Signs/symptoms of bleeding, Laboratory test error suspected, Change in health, Change in alcohol use, Change in activity, Upcoming invasive procedure, Emergency department visit, Upcoming dental procedure, Missed doses, Extra doses, Change in medications, Change in diet/appetite, Hospital admission, Bruising, Other complaints     Plan:  1. INR was therapeutic on 12/15 at 2.33, results received 12/17. Instructed Mr. Ortega to continue maintenance dose of warfarin 10mg oral daily until recheck  2. Repeat INR in 4 weeks, 1/12/21  3. Verbal information provided over the phone. Eliz Ortega's daughter, Dianne, RBV dosing instructions, expresses understanding by teach back, and has no further questions at this time.  4. Going forward, Mr. Ortega requests the clinic does not ask him interview questions at each encounter. He agrees to call the clinic with any changes or upcoming surgeries/procedures. He also requests we talk to either Geni (spouse) or Dianne (daughter) when we call. They fill up his medication planner for him. He says it is ok for us to confirm dosing with Dianne or Geni and then give them next testing date. If out of range, Mr. Ortega was advised we will have to ask him interview questions.  5. Patient requests new PT/INR standing order be sent to [facility] (FX: [phone])    Fredrick Rosales CPhT  12/17/2020  08:33 EST      Order printed  I, Mercedes Alas, PharmD, have reviewed the note in full and agree with the assessment and plan.  12/17/20  09:26 EST

## 2021-01-15 ENCOUNTER — ANTICOAGULATION VISIT (OUTPATIENT)
Dept: PHARMACY | Facility: HOSPITAL | Age: 61
End: 2021-01-15

## 2021-01-15 DIAGNOSIS — I48.20 CHRONIC ATRIAL FIBRILLATION (HCC): ICD-10-CM

## 2021-01-15 LAB — INR PPP: 2.21

## 2021-01-15 NOTE — PROGRESS NOTES
Anticoagulation Clinic - Remote Progress Note  REMOTE LAB    Indication: chronic afib  Referring Provider: Heather (Last seen: 2/20/20  next appt:3/4/2021)  Goal INR: 2.0-3.0  Current Drug Interactions: aspirin, ranitidine, levothyroxine  CHADS-VASc: 1 (HTN)    Diet: GLV: green beans occasionally (11/17/20)  Alcohol: none  Tobacco: 1 can of dip/smokeless tobacco a day  OTC Pain Medication: Ibuprofen or APAP -- advised to minimize ibuprofen use, if at all, and to use APAP whenever possible  Med list updated 5/15/19    INR History:  Date 8/23 9/26 10/24 11/22 12/19 1/23/18 2/21 3/20 4/24 5/21 6/20 7/24   Total Weekly Dose 70mg 70mg 70mg 70mg 70mg 70mg 70mg 70mg 70mg 70mg 70mg 70mg   INR 2.9 2.3 2.4 2.9 2.8 2.4 2.5 2.3 2.9 2.1 2.9 3.1   Notes                 Date 8/7 8/21 9/25 10/22 11/20 12/17 1/10/19 2/1 3/5 4/2 4/15 5/14   Total Weekly Dose 70mg 70mg 70mg 80mg??? 70mg 70mg 70mg 70mg 70mg 70mg 70mg 70mg   INR 2.1 2.3 2.4 3.1 3.03 1.89 2.31 2.14 2.21 1.80 2.34 2.54   Notes    extra dose?  incr GLV 1x incr dose          Date 6/13 7/15 8/15 9/11 10/15 11/13 12/10 1/9/20 1/21 2/12 3/11 4/16   Total Weekly Dose 70mg 70mg 70mg 70mg 70mg 70mg 70mg 70mg 70mg 70mg 70mg 70mg   INR 2.87 2.87 3.15 2.41 2.71 2.60 2.26 3.68 2.61 2.27 2.65 2.09   Notes  levothyrox start      sick   recv'd 3/12      Date 5/12 6/16 7/17 8/19 9/22 10/20 11/17 12/15 1/14/21      Total Weekly Dose 70mg 70mg 70mg 70mg 70 mg 70mg 70mg 70mg 70mg      INR 2.64 2.27 2.24 2.06 3.05 3.09 2.70 2.33 2.21      Notes     recv'd 9/24 recv'd 10/22  recv'd 12/17         Phone Interview:  Tablet Strength: 10mg tablets  Patient Contact Info: [phone] or [phone] **please speak with Geni or Dianne regarding results**  Lab Contact Info: [facility] [phone]  Verbal Release Authorization: On 12/12/19 patient gave permission via phone to speak with wife, Geni or daughter, Dianne about results.  **Patient prefers we do not ask interview questions  if INR is in range - just verify dosing with daughter or spouse and give next repeat INR date**    Patient Findings  Negatives:  Signs/symptoms of thrombosis, Signs/symptoms of bleeding, Laboratory test error suspected, Change in health, Change in alcohol use, Change in activity, Upcoming invasive procedure, Emergency department visit, Upcoming dental procedure, Missed doses, Extra doses, Change in medications, Change in diet/appetite, Hospital admission, Bruising, Other complaints   Comments:  Geni denies all findings for patient       Plan:  1. INR was therapeutic yesterday at 2.21. Instructed Geni to have Mr. Ortega to continue maintenance dose of warfarin 10mg oral daily until recheck  2. Repeat INR in 4 weeks, 2/11  3. Verbal information provided over the phone. Eliz Ortega's spouse, Geni, RBV dosing instructions, expresses understanding by teach back, and has no further questions at this time.  4. Going forward, Mr. Ortega requests the clinic does not ask him interview questions at each encounter. He agrees to call the clinic with any changes or upcoming surgeries/procedures. He also requests we talk to either Geni (spouse) or Dianne (daughter) when we call. They fill up his medication planner for him. He says it is ok for us to confirm dosing with Dianne or Geni and then give them next testing date. If out of range, Mr. Ortega was advised we will have to ask him interview questions.    Fredrick Rosales, [facility]  1/15/2021  13:28 Jennifer LOU, PharmD, have reviewed the note in full and agree with the assessment and plan.  01/15/21  16:24 EST

## 2021-02-23 ENCOUNTER — ANTICOAGULATION VISIT (OUTPATIENT)
Dept: PHARMACY | Facility: HOSPITAL | Age: 61
End: 2021-02-23

## 2021-02-23 DIAGNOSIS — I48.20 CHRONIC ATRIAL FIBRILLATION (HCC): ICD-10-CM

## 2021-02-23 LAB — INR PPP: 2.45

## 2021-02-23 NOTE — PROGRESS NOTES
Anticoagulation Clinic - Remote Progress Note  REMOTE LAB    Indication: chronic afib  Referring Provider: Heather (Last seen: 2/20/20  next appt:3/4/2021)  Goal INR: 2.0-3.0  Current Drug Interactions: aspirin, ranitidine, levothyroxine  CHADS-VASc: 1 (HTN)    Diet: GLV: green beans occasionally (11/17/20)  Alcohol: none  Tobacco: 1 can of dip/smokeless tobacco a day  OTC Pain Medication: Ibuprofen or APAP -- advised to minimize ibuprofen use, if at all, and to use APAP whenever possible  Med list updated 5/15/19    INR History:  Date 8/23 9/26 10/24 11/22 12/19 1/23/18 2/21 3/20 4/24 5/21 6/20 7/24   Total Weekly Dose 70mg 70mg 70mg 70mg 70mg 70mg 70mg 70mg 70mg 70mg 70mg 70mg   INR 2.9 2.3 2.4 2.9 2.8 2.4 2.5 2.3 2.9 2.1 2.9 3.1   Notes                 Date 8/7 8/21 9/25 10/22 11/20 12/17 1/10/19 2/1 3/5 4/2 4/15 5/14   Total Weekly Dose 70mg 70mg 70mg 80mg??? 70mg 70mg 70mg 70mg 70mg 70mg 70mg 70mg   INR 2.1 2.3 2.4 3.1 3.03 1.89 2.31 2.14 2.21 1.80 2.34 2.54   Notes    extra dose?  incr GLV 1x incr dose          Date 6/13 7/15 8/15 9/11 10/15 11/13 12/10 1/9/20 1/21 2/12 3/11 4/16   Total Weekly Dose 70mg 70mg 70mg 70mg 70mg 70mg 70mg 70mg 70mg 70mg 70mg 70mg   INR 2.87 2.87 3.15 2.41 2.71 2.60 2.26 3.68 2.61 2.27 2.65 2.09   Notes  levothyrox start      sick   recv'd 3/12      Date 5/12 6/16 7/17 8/19 9/22 10/20 11/17 12/15 1/14/21 2/23     Total Weekly Dose 70mg 70mg 70mg 70mg 70 mg 70mg 70mg 70mg 70mg 70mg     INR 2.64 2.27 2.24 2.06 3.05 3.09 2.70 2.33 2.21 2.45     Notes     recv'd 9/24 recv'd 10/22  recv'd 12/17         Phone Interview:  Tablet Strength: 10mg tablets  Patient Contact Info: [phone] or [phone] **please speak with Geni or Dianne regarding results**  Lab Contact Info: [facility] [phone]  Verbal Release Authorization: On 12/12/19 patient gave permission via phone to speak with wife, Geni or daughter, Dianne about results.  **Patient prefers we do not ask  interview questions if INR is in range - just verify dosing with daughter or spouse and give next repeat INR date**    Patient Findings  Negatives:  Signs/symptoms of thrombosis, Signs/symptoms of bleeding, Laboratory test error suspected, Change in health, Change in alcohol use, Change in activity, Upcoming invasive procedure, Emergency department visit, Upcoming dental procedure, Missed doses, Extra doses, Change in medications, Change in diet/appetite, Hospital admission, Bruising, Other complaints   Comments:  Geni reports that nothing has changed with Mr. Ortega.     Plan:  1. INR was therapeutic yesterday at 2.45. Instructed Geni to have Mr. Ortega to continue maintenance dose of warfarin 10mg oral daily until recheck  2. Repeat INR in 4 weeks, 3/23.  3. Verbal information provided over the phone. Eliz Ortega's spouse, Geni, RBV dosing instructions, expresses understanding by teach back, and has no further questions at this time.  4. Going forward, Mr. Ortega requests the clinic does not ask him interview questions at each encounter. He agrees to call the clinic with any changes or upcoming surgeries/procedures. He also requests we talk to either Geni (spouse) or Dianne (daughter) when we call. They fill up his medication planner for him. He says it is ok for us to confirm dosing with Dianne or Geni and then give them next testing date. If out of range, Mr. Ortega was advised we will have to ask him interview questions.    Josh Gee, Pharmacy Student  02/23/21  16:24 Jennifer LOU, PharmD, have reviewed the note in full and agree with the assessment and plan.  02/24/21  08:21 EST

## 2021-03-03 NOTE — PROGRESS NOTES
[facility] Cardiology  Follow Up Visit  lEiz Ortega  1960    VISIT DATE:  03/04/21    PCP:   Anthony Mukherjee MD  [address] [place] [address]          CC:  Coronary Artery Disease      Problem List:  1. Coronary artery disease:  a. 01/08/2015: Lateral STEMI with emergent 3.0 x 18 Xience KENZIE to first diagonal, non-obstructive disease  otherwise. EF 45%.   b. Echocardiogram at [facility] 2015 showed ejection fraction of 50% with normal cardiac valves mild mitral regurgitation and mild tricuspid regurgitation  2. Atrial fibrillation, new onset, unclear duration:   a. Cardioversion x2, 01/08/2015 and 01/09/2015, with conversion to sinus   Initiation of sotalol  and warfarin.   b. 02/05/2015: Documentation of recurrent atrial fibrillation, unknown duration, asymptomatic.  Committed to rate control anticoagulation.   3. Dyslipidemia.  Lipids from June 2019 reviewed showed a total cholesterol 108, triglycerides of 108, HDL of 41, and LDL 45.  Chemistry was within normal limits  4. Borderline hypertension.   5. Hypothyroid on replacement last TSH elevated at 5.7    Reviewed patient's most recent blood work from 1/13/2021:   T4 and TSH within normal limits  CMP: AST borderline elevated at 45, normal creatinine and GFR  Normal CBC        History of Present Illness:  Eliz Ortega  Is a 60 y.o. male with pertinent cardiac history detailed above.  Patient returning for 1 year follow-up.  He has a history of coronary disease and atrial fibrillation.  He is on long-term warfarin and follows with anticoagulation clinic.  He feels well without complaints of dyspnea, palpitations, chest pain.  He states he walks between a mile and a mile and a half a day.  Main concern was maintained weight.  Has had difficulty losing.  We discussed trying to cut back on the amount of fried foods he eats and decrease total caloric intake.  No other complaints or concerns      Patient  Active Problem List    Diagnosis Date Noted   • Chronic atrial fibrillation (CMS/[facility]) 07/24/2018   • Essential hypertension 09/07/2017   • Mixed hyperlipidemia 09/07/2017   • Coronary artery disease      Note Last Updated: 9/30/2016     1. Coronary artery disease:  a. 01/08/2015: Lateral STEMI with emergent 3.0 x 18 Xience KENZIE to first diagonal, non-obstructive disease otherwise. EF 45%.        • A-fib (CMS/[facility])      Note Last Updated: 8/4/2017     2. Atrial fibrillation, new onset, unclear duration:   a. Cardioversion x2, 01/08/2015 and 01/09/2015, with conversion to sinus mechanism.  Initiation of sotalol and warfarin.   b. 02/05/2015: Documentation of recurrent atrial fibrillation, unknown duration, asymptomatic.  Committed to rate control anticoagulation.        • Dyslipidemia    • BP (high blood pressure)      Note Last Updated: 8/4/2017     Borderline hypertension.         Allergies   Allergen Reactions   • Penicillins Hives       Social History     Socioeconomic History   • Marital status: Single     Spouse name: Not on file   • Number of children: Not on file   • Years of education: Not on file   • Highest education level: Not on file   Occupational History     Employer: [employer]   Tobacco Use   • Smoking status: Never Smoker   • Smokeless tobacco: Former User     Types: Snuff   Substance and Sexual Activity   • Alcohol use: No   • Drug use: No   • Sexual activity: Defer       Family History   Problem Relation Age of Onset   • Hypertension Mother    • Heart attack Mother    • No Known Problems Father        Current Medications:    Current Outpatient Medications:   •  aspirin 81 MG EC tablet, Take 81 mg by mouth Daily., Disp: , Rfl:   •  atorvastatin (LIPITOR) 20 MG tablet, Take 20 mg by mouth Every Night., Disp: , Rfl:   •  bisoprolol (ZEBeta) 5 MG tablet, Take 2.5 mg by mouth Daily., Disp: , Rfl:   •  famotidine (PEPCID) 20 MG tablet, Take 20 mg by mouth Daily., Disp: , Rfl:   •  levothyroxine (SYNTHROID,  "LEVOTHROID) 50 MCG tablet, Take 50 mcg by mouth Daily., Disp: , Rfl:   •  losartan (COZAAR) 12.5 MG half tablet, Take 12.5 mg by mouth Daily., Disp: , Rfl:   •  nitroglycerin (NITROSTAT) 0.4 MG SL tablet, Place 0.4 mg under the tongue Every 5 (Five) Minutes As Needed., Disp: , Rfl:   •  warfarin (COUMADIN) 10 MG tablet, Take 1 tablet by mouth daily, or as directed by the anticoagulation clinic., Disp: 90 tablet, Rfl: 3     Review of Systems   Cardiovascular: Negative for chest pain, dyspnea on exertion, irregular heartbeat, near-syncope, palpitations and syncope.   All other systems reviewed and are negative.      Vitals:    03/04/21 0930   BP: 130/70   BP Location: Left arm   Patient Position: Sitting   Pulse: 98   Temp: 97 °F (36.1 °C)   SpO2: 98%   Weight: 96.3 kg (212 lb 6.4 oz)   Height: 170.2 cm (67\")       Physical Exam  Neck:      Musculoskeletal: Normal range of motion and neck supple.      Vascular: No carotid bruit.   Cardiovascular:      Rate and Rhythm: Normal rate and regular rhythm.      Pulses: Normal pulses.      Heart sounds: No murmur.   Pulmonary:      Effort: Pulmonary effort is normal.   Musculoskeletal:      Right lower leg: No edema.      Left lower leg: No edema.   Skin:     General: Skin is warm and dry.   Neurological:      General: No focal deficit present.      Mental Status: He is alert.         Diagnostic Data:  Procedures  Lab Results   Component Value Date    CHLPL 128 01/09/2015    TRIG 108 01/09/2015    HDL 37 (L) 01/09/2015     Lab Results   Component Value Date    GLUCOSE 94 01/09/2015    BUN 15 01/09/2015    CREATININE 0.7 01/09/2015     01/09/2015    K 3.9 01/09/2015     01/09/2015    CO2 23 01/09/2015     Lab Results   Component Value Date    HGBA1C 5.4 01/09/2015     Lab Results   Component Value Date    WBC 9.36 01/09/2015    HGB 13.9 01/09/2015    HCT 42.5 01/09/2015     01/09/2015       Assessment:   Diagnosis Plan   1. Chronic atrial fibrillation " (CMS/[facility])  Adult Transthoracic Echo Complete W/ Cont if Necessary Per Protocol       Plan:    1.  CAD with prior lateral STEMI  -Continue aspirin, atorvastatin, bisoprolol  -Lipids at goal  -Currently asymptomatic, no angina     2.  Persistent atrial fibrillation.  -Patient is currently under a rate control and anticoagulation strategy   -Rate is controlled on bisoprolol  -He is on warfarin and follows with anticoagulation clinic for INR checks has been staying between 2 and 3  -No current symptoms     3.  HTN  -Controlled on current medication includes bisoprolol and losartan     4.  Hypothyroid  -Followed by his PCP last TSH reviewed and was within normal limits    5.  Mild mitral and tricuspid regurgitation echo 2015  -We will check an updated echocardiogram for surveillance    Follow-up in 1 year, sooner as needed    Felipe Romero MD [facility]

## 2021-03-04 ENCOUNTER — OFFICE VISIT (OUTPATIENT)
Dept: CARDIOLOGY | Facility: CLINIC | Age: 61
End: 2021-03-04

## 2021-03-04 VITALS
WEIGHT: 212.4 LBS | HEIGHT: 67 IN | DIASTOLIC BLOOD PRESSURE: 70 MMHG | BODY MASS INDEX: 33.34 KG/M2 | SYSTOLIC BLOOD PRESSURE: 130 MMHG | HEART RATE: 98 BPM | TEMPERATURE: 97 F | OXYGEN SATURATION: 98 %

## 2021-03-04 DIAGNOSIS — I48.20 CHRONIC ATRIAL FIBRILLATION (HCC): Primary | ICD-10-CM

## 2021-03-04 PROCEDURE — 99214 OFFICE O/P EST MOD 30 MIN: CPT | Performed by: INTERNAL MEDICINE

## 2021-03-24 ENCOUNTER — ANTICOAGULATION VISIT (OUTPATIENT)
Dept: PHARMACY | Facility: HOSPITAL | Age: 61
End: 2021-03-24

## 2021-03-24 DIAGNOSIS — I48.20 CHRONIC ATRIAL FIBRILLATION (HCC): ICD-10-CM

## 2021-03-24 LAB — INR PPP: 2.56

## 2021-03-24 NOTE — PROGRESS NOTES
Anticoagulation Clinic - Remote Progress Note  REMOTE LAB    Indication: chronic afib  Referring Provider: Heather (Last seen: 2/20/20  next appt:3/4/2021)  Goal INR: 2.0-3.0  Current Drug Interactions: aspirin, ranitidine, levothyroxine  CHADS-VASc: 1 (HTN)    Diet: GLV: green beans occasionally (11/17/20)  Alcohol: none  Tobacco: 1 can of dip/smokeless tobacco a day  OTC Pain Medication: Ibuprofen or APAP -- advised to minimize ibuprofen use, if at all, and to use APAP whenever possible  Med list updated 5/15/19    INR History:  Date 8/23 9/26 10/24 11/22 12/19 1/23/18 2/21 3/20 4/24 5/21 6/20 7/24   Total Weekly Dose 70mg 70mg 70mg 70mg 70mg 70mg 70mg 70mg 70mg 70mg 70mg 70mg   INR 2.9 2.3 2.4 2.9 2.8 2.4 2.5 2.3 2.9 2.1 2.9 3.1   Notes                 Date 8/7 8/21 9/25 10/22 11/20 12/17 1/10/19 2/1 3/5 4/2 4/15 5/14   Total Weekly Dose 70mg 70mg 70mg 80mg??? 70mg 70mg 70mg 70mg 70mg 70mg 70mg 70mg   INR 2.1 2.3 2.4 3.1 3.03 1.89 2.31 2.14 2.21 1.80 2.34 2.54   Notes    extra dose?  incr GLV 1x incr dose          Date 6/13 7/15 8/15 9/11 10/15 11/13 12/10 1/9/20 1/21 2/12 3/11 4/16   Total Weekly Dose 70mg 70mg 70mg 70mg 70mg 70mg 70mg 70mg 70mg 70mg 70mg 70mg   INR 2.87 2.87 3.15 2.41 2.71 2.60 2.26 3.68 2.61 2.27 2.65 2.09   Notes  levothyrox start      sick   recv'd 3/12      Date 5/12 6/16 7/17 8/19 9/22 10/20 11/17 12/15 1/14/21 2/23 3/24    Total Weekly Dose 70mg 70mg 70mg 70mg 70 mg 70mg 70mg 70mg 70mg 70mg 70mg    INR 2.64 2.27 2.24 2.06 3.05 3.09 2.70 2.33 2.21 2.45 2.56    Notes     recv'd 9/24 recv'd 10/22  recv'd 12/17         Phone Interview:  Tablet Strength: 10mg tablets  Patient Contact Info: [phone] or [phone] **please speak with Gein or Dianne regarding results**  Lab Contact Info: [facility] [phone]  Verbal Release Authorization: On 12/12/19 patient gave permission via phone to speak with wife, Geni or daughter, Dianne about results.  **Patient prefers we do not  "ask interview questions if INR is in range - just verify dosing with daughter or spouse and give next repeat INR date**    Patient Findings  Positives:  Other complaints   Negatives:  Signs/symptoms of thrombosis, Signs/symptoms of bleeding, Laboratory test error suspected, Change in health, Change in alcohol use, Change in activity, Upcoming invasive procedure, Emergency department visit, Upcoming dental procedure, Missed doses, Extra doses, Change in medications, Change in diet/appetite, Hospital admission, Bruising   Comments:  Geni reports patient had severe \"dizzy spell\" ~2 weeks ago. She feels patient would not feel comfortable reaching out/discussing w/cardiologist and asked for my advice. I made sure she was aware I am not able to discuss this but would transfer her to patient's cardiologist, Dr. Felipe Romero, after our encounter. She voiced appreciation and was agreeable to this.     Otherwise she denies findings for patient.     Plan:  1. INR is therapeutic today at 2.56. Instructed Geni to have Mr. Ortega to continue maintenance dose of warfarin 10mg oral daily until recheck  2. Repeat INR in 4 weeks, 4/21  3. Verbal information provided over the phone. Eliz Ortega's spouse, Geni, RBV dosing instructions, expresses understanding by teach back, and has no further questions at this time.  4. Going forward, Mr. Ortega requests the clinic does not ask him interview questions at each encounter. He agrees to call the clinic with any changes or upcoming surgeries/procedures. He also requests we talk to either Geni (spouse) or Dianne (daughter) when we call. They fill up his medication planner for him. He says it is ok for us to confirm dosing with Dianne or Geni and then give them next testing date. If out of range, Mr. Ortega was advised we will have to ask him interview questions.    Fredrick Rosales, BOSSMAN  3/24/2021  15:00 EDT    I, Maricarmen Grider, PharmD, have reviewed the note in full " and agree with the assessment and plan.  Ms. Ortega called the clinic again to report his dizzy spell.  I provided her with the number to [facility].  She verbalized understanding of the need to discuss with his cardiologist  03/24/21  15:21 EDT

## 2021-03-29 ENCOUNTER — OUTSIDE FACILITY SERVICE (OUTPATIENT)
Dept: CARDIOLOGY | Facility: CLINIC | Age: 61
End: 2021-03-29

## 2021-03-29 PROCEDURE — 93306 TTE W/DOPPLER COMPLETE: CPT | Performed by: INTERNAL MEDICINE

## 2021-04-20 ENCOUNTER — ANTICOAGULATION VISIT (OUTPATIENT)
Dept: PHARMACY | Facility: HOSPITAL | Age: 61
End: 2021-04-20

## 2021-04-20 DIAGNOSIS — I48.20 CHRONIC ATRIAL FIBRILLATION (HCC): Primary | ICD-10-CM

## 2021-04-20 LAB — INR PPP: 2.24

## 2021-04-20 NOTE — PROGRESS NOTES
Anticoagulation Clinic - Remote Progress Note  REMOTE LAB    Indication: chronic afib  Referring Provider: Heather (Last seen: 2/20/20  next appt:3/4/2021)  Goal INR: 2.0-3.0  Current Drug Interactions: aspirin, ranitidine, levothyroxine  CHADS-VASc: 1 (HTN)    Diet: GLV: green beans occasionally (4/20/21)  Alcohol: none  Tobacco: 1 can of dip/smokeless tobacco a day  OTC Pain Medication: Ibuprofen or APAP -- advised to minimize ibuprofen use, if at all, and to use APAP whenever possible  Med list updated 5/15/19    INR History:  Date 8/23 9/26 10/24 11/22 12/19 1/23/18 2/21 3/20 4/24 5/21 6/20 7/24   Total Weekly Dose 70mg 70mg 70mg 70mg 70mg 70mg 70mg 70mg 70mg 70mg 70mg 70mg   INR 2.9 2.3 2.4 2.9 2.8 2.4 2.5 2.3 2.9 2.1 2.9 3.1   Notes                 Date 8/7 8/21 9/25 10/22 11/20 12/17 1/10/19 2/1 3/5 4/2 4/15 5/14   Total Weekly Dose 70mg 70mg 70mg 80mg??? 70mg 70mg 70mg 70mg 70mg 70mg 70mg 70mg   INR 2.1 2.3 2.4 3.1 3.03 1.89 2.31 2.14 2.21 1.80 2.34 2.54   Notes    extra dose?  incr GLV 1x incr dose          Date 6/13 7/15 8/15 9/11 10/15 11/13 12/10 1/9/20 1/21 2/12 3/11 4/16   Total Weekly Dose 70mg 70mg 70mg 70mg 70mg 70mg 70mg 70mg 70mg 70mg 70mg 70mg   INR 2.87 2.87 3.15 2.41 2.71 2.60 2.26 3.68 2.61 2.27 2.65 2.09   Notes  levothyrox start      sick   recv'd 3/12      Date 5/12 6/16 7/17 8/19 9/22 10/20 11/17 12/15 1/14/21 2/23 3/24 4/20   Total Weekly Dose 70mg 70mg 70mg 70mg 70 mg 70mg 70mg 70mg 70mg 70mg 70mg 70 mg   INR 2.64 2.27 2.24 2.06 3.05 3.09 2.70 2.33 2.21 2.45 2.56 2.24   Notes     recv'd 9/24 recv'd 10/22  recv'd 12/17         Phone Interview:  Tablet Strength: 10mg tablets  Patient Contact Info: [phone] or [phone] **please speak with Geni or Dianne regarding results**  Lab Contact Info: [facility] [phone]  Verbal Release Authorization: On 12/12/19 patient gave permission via phone to speak with wife, Geni or daughter, Dianne about results.  **Patient  prefers we do not ask interview questions if INR is in range - just verify dosing with daughter or spouse and give next repeat INR date**    Patient Findings:  Negatives:  Signs/symptoms of thrombosis, Signs/symptoms of bleeding, Laboratory test error suspected, Change in health, Change in alcohol use, Change in activity, Upcoming invasive procedure, Emergency department visit, Upcoming dental procedure, Missed doses, Extra doses, Change in medications, Change in diet/appetite, Hospital admission, Bruising, Other complaints   Comments:  Patient daughterDianne, denies any changes.     Plan:  1. INR is therapeutic today at 2.24. Instructed Dianne to have Mr. Ortega to continue maintenance dose of warfarin 10 mg daily until recheck.  2. Repeat INR in 4 weeks.  3. Verbal information provided over the phone. Eliz Ortega's daughterDianne, RBV dosing instructions, expresses understanding by teach back, and has no further questions at this time.  4. Going forward, Mr. Ortega requests the clinic does not ask him interview questions at each encounter. He agrees to call the clinic with any changes or upcoming surgeries/procedures. He also requests we talk to either Geni (spouse) or Dianne (daughter) when we call. They fill up his medication planner for him. He says it is ok for us to confirm dosing with Dianne or Geni and then give them next testing date. If out of range, Mr. Ortega was advised we will have to ask him interview questions.    Melissa Sanchez, [facility]  4/20/2021  14:20 EDT     I, Jennifer Childress, PharmD, have reviewed the note in full and agree with the assessment and plan.  04/20/21  15:48 EDT

## 2021-05-17 ENCOUNTER — ANTICOAGULATION VISIT (OUTPATIENT)
Dept: PHARMACY | Facility: HOSPITAL | Age: 61
End: 2021-05-17

## 2021-05-17 DIAGNOSIS — I48.20 CHRONIC ATRIAL FIBRILLATION (HCC): Primary | ICD-10-CM

## 2021-05-17 LAB — INR PPP: 11.15

## 2021-05-17 NOTE — PROGRESS NOTES
Anticoagulation Clinic - Remote Progress Note  REMOTE LAB    Indication: chronic afib  Referring Provider: Heather (Last seen: 2/20/20  next appt:3/4/2021)  Goal INR: 2.0-3.0  Current Drug Interactions: aspirin, ranitidine, levothyroxine  CHADS-VASc: 1 (HTN)    Diet: GLV: green beans occasionally (5/17/21)  Alcohol: none  Tobacco: None 5/17/2021  OTC Pain Medication: Ibuprofen or APAP -- advised to minimize ibuprofen use, if at all, and to use APAP whenever possible  Med list updated 5/15/19    INR History:  Date 8/23 9/26 10/24 11/22 12/19 1/23/18 2/21 3/20 4/24 5/21 6/20 7/24   Total Weekly Dose 70mg 70mg 70mg 70mg 70mg 70mg 70mg 70mg 70mg 70mg 70mg 70mg   INR 2.9 2.3 2.4 2.9 2.8 2.4 2.5 2.3 2.9 2.1 2.9 3.1   Notes                 Date 8/7 8/21 9/25 10/22 11/20 12/17 1/10/19 2/1 3/5 4/2 4/15 5/14   Total Weekly Dose 70mg 70mg 70mg 80mg??? 70mg 70mg 70mg 70mg 70mg 70mg 70mg 70mg   INR 2.1 2.3 2.4 3.1 3.03 1.89 2.31 2.14 2.21 1.80 2.34 2.54   Notes    extra dose?  incr GLV 1x incr dose          Date 6/13 7/15 8/15 9/11 10/15 11/13 12/10 1/9/20 1/21 2/12 3/11 4/16   Total Weekly Dose 70mg 70mg 70mg 70mg 70mg 70mg 70mg 70mg 70mg 70mg 70mg 70mg   INR 2.87 2.87 3.15 2.41 2.71 2.60 2.26 3.68 2.61 2.27 2.65 2.09   Notes  levothyrox start      sick   recv'd 3/12      Date 5/12 6/16 7/17 8/19 9/22 10/20 11/17 12/15 1/14/21 2/23 3/24 4/20   Total Weekly Dose 70mg 70mg 70mg 70mg 70 mg 70mg 70mg 70mg 70mg 70mg 70mg 70 mg   INR 2.64 2.27 2.24 2.06 3.05 3.09 2.70 2.33 2.21 2.45 2.56 2.24   Notes     recv'd 9/24 recv'd 10/22  recv'd 12/17         Date 5/17              Total Weekly Dose 70mg              INR 11.15              Notes                 Phone Interview:  Tablet Strength: 10mg tablets  Patient Contact Info: [phone] or [phone] **please speak with Geni or Dianne regarding results**  Lab Contact Info: [facility] [phone]  Verbal Release Authorization: On 12/12/19 patient gave permission via  phone to speak with wife, Geni or daughterDianne about results.  **Patient prefers we do not ask interview questions if INR is in range - just verify dosing with daughter or spouse and give next repeat INR date**    Patient Findings  Negatives:  Signs/symptoms of thrombosis, Signs/symptoms of bleeding, Laboratory test error suspected, Change in health, Change in alcohol use, Change in activity, Upcoming invasive procedure, Emergency department visit, Upcoming dental procedure, Missed doses, Extra doses, Change in medications, Change in diet/appetite, Hospital admission, Bruising, Other complaints   Comments:  Patient stopped chew tobacco one year ago. Denies all patient findings at this time.     Plan:  1. INR is critically SUPRAtherapeutic today at 11.15. Instructed Geni to have Mr. Ortega to HOLD Warfarin today and eat a serving of cooked spinach.  2. Repeat INR tomorrow to confirm lab.   3. Spoke with Geni who will relay this information to Mr. Ortega immediately. Also discussed s/sx of bleeding to be aware of and need to seek immediate medical attention if they occur or if he has any significant decreases.   4. Verbal information provided over the phone. Eliz Ortega's daughter, Geni, RBV dosing instructions, expresses understanding by teach back, and has no further questions at this time.  5. Going forward, Mr. Ortega requests the clinic does not ask him interview questions at each encounter. He agrees to call the clinic with any changes or upcoming surgeries/procedures. He also requests we talk to either Geni (spouse) or Dianne (daughter) when we call. They fill up his medication planner for him. He says it is ok for us to confirm dosing with Dianne or Geni and then give them next testing date. If out of range, Mr. Ortega was advised we will have to ask him interview questions.    Jennifer Childress, PharmD  5/17/2021  14:30 EDT

## 2021-05-18 ENCOUNTER — ANTICOAGULATION VISIT (OUTPATIENT)
Dept: PHARMACY | Facility: HOSPITAL | Age: 61
End: 2021-05-18

## 2021-05-18 DIAGNOSIS — I48.20 CHRONIC ATRIAL FIBRILLATION (HCC): Primary | ICD-10-CM

## 2021-05-18 LAB — INR PPP: 1.52

## 2021-05-18 NOTE — PROGRESS NOTES
Anticoagulation Clinic - Remote Progress Note  REMOTE LAB    Indication: chronic afib  Referring Provider: Heather (Last seen: 2/20/20  next appt:3/4/2021)  Goal INR: 2.0-3.0  Current Drug Interactions: aspirin, ranitidine, levothyroxine  CHADS-VASc: 1 (HTN)    Diet: GLV: green beans occasionally (5/17/21)  Alcohol: none  Tobacco: None 5/17/2021  OTC Pain Medication: Ibuprofen or APAP -- advised to minimize ibuprofen use, if at all, and to use APAP whenever possible  Med list updated 5/15/19    INR History:  Date 8/23 9/26 10/24 11/22 12/19 1/23/18 2/21 3/20 4/24 5/21 6/20 7/24   Total Weekly Dose 70mg 70mg 70mg 70mg 70mg 70mg 70mg 70mg 70mg 70mg 70mg 70mg   INR 2.9 2.3 2.4 2.9 2.8 2.4 2.5 2.3 2.9 2.1 2.9 3.1   Notes                 Date 8/7 8/21 9/25 10/22 11/20 12/17 1/10/19 2/1 3/5 4/2 4/15 5/14   Total Weekly Dose 70mg 70mg 70mg 80mg??? 70mg 70mg 70mg 70mg 70mg 70mg 70mg 70mg   INR 2.1 2.3 2.4 3.1 3.03 1.89 2.31 2.14 2.21 1.80 2.34 2.54   Notes    extra dose?  incr GLV 1x incr dose          Date 6/13 7/15 8/15 9/11 10/15 11/13 12/10 1/9/20 1/21 2/12 3/11 4/16   Total Weekly Dose 70mg 70mg 70mg 70mg 70mg 70mg 70mg 70mg 70mg 70mg 70mg 70mg   INR 2.87 2.87 3.15 2.41 2.71 2.60 2.26 3.68 2.61 2.27 2.65 2.09   Notes  levothyrox start      sick   recv'd 3/12      Date 5/12 6/16 7/17 8/19 9/22 10/20 11/17 12/15 1/14/21 2/23 3/24 4/20   Total Weekly Dose 70mg 70mg 70mg 70mg 70 mg 70mg 70mg 70mg 70mg 70mg 70mg 70 mg   INR 2.64 2.27 2.24 2.06 3.05 3.09 2.70 2.33 2.21 2.45 2.56 2.24   Notes     recv'd 9/24 recv'd 10/22  recv'd 12/17         Date 5/17 5/18             Total Weekly Dose 70mg 70mg             INR 11.15 1.52             Notes                 Phone Interview:  Tablet Strength: 10mg tablets  Patient Contact Info: [phone] or [phone] **please speak with Geni or Dianne regarding results**  Lab Contact Info: [facility] [phone]  Verbal Release Authorization: On 12/12/19 patient gave  permission via phone to speak with wife, Geni or daughterDianne about results.  **Patient prefers we do not ask interview questions if INR is in range - just verify dosing with daughter or spouse and give next repeat INR date**    Patient Findings    Negatives:  Signs/symptoms of thrombosis, Signs/symptoms of bleeding, Laboratory test error suspected, Change in health, Change in alcohol use, Change in activity, Upcoming invasive procedure, Emergency department visit, Upcoming dental procedure, Missed doses, Extra doses, Change in medications, Change in diet/appetite, Hospital admission, Bruising, Other complaints         Plan:  1. INR is subtherapeutic today at 1.52 after holding for an INR of 11.15 and eating an extra helping of greens.. Instructed Geni to have Mr. Ortega to boost Warfarin today to 15mg and continue 10mg dialy until recheck.  2. Repeat INR 5/24/21  3. Spoke with Geni who will relay this information to Mr. Ortega immediately. Also discussed s/sx of bleeding to be aware of and need to seek immediate medical attention if they occur or if he has any significant decreases.   4. Verbal information provided over the phone. Eliz Ortega's daughter, Geni, RBV dosing instructions, expresses understanding by teach back, and has no further questions at this time.  5. Going forward, Mr. Ortega requests the clinic does not ask him interview questions at each encounter. He agrees to call the clinic with any changes or upcoming surgeries/procedures. He also requests we talk to either Geni (spouse) or Dianne (daughter) when we call. They fill up his medication planner for him. He says it is ok for us to confirm dosing with Dianne or Geni and then give them next testing date. If out of range, Mr. Ortega was advised we will have to ask him interview questions.    Thank you,    Carlos GriggsD, PARRISH  5/18/2021  14:18 EDT

## 2021-06-10 ENCOUNTER — ANTICOAGULATION VISIT (OUTPATIENT)
Dept: PHARMACY | Facility: HOSPITAL | Age: 61
End: 2021-06-10

## 2021-06-10 DIAGNOSIS — I48.20 CHRONIC ATRIAL FIBRILLATION (HCC): Primary | ICD-10-CM

## 2021-06-10 LAB — INR PPP: 1.95

## 2021-06-10 NOTE — PROGRESS NOTES
Anticoagulation Clinic - Remote Progress Note  REMOTE LAB    Indication: chronic afib  Referring Provider: Heather (Last seen: 2/20/20  next appt:3/4/2021)  Goal INR: 2.0-3.0  Current Drug Interactions: aspirin, ranitidine, levothyroxine  CHADS-VASc: 1 (HTN)    Diet: GLV: green beans occasionally (5/17/21)  Alcohol: none  Tobacco: None 5/17/2021  OTC Pain Medication: Ibuprofen or APAP -- advised to minimize ibuprofen use, if at all, and to use APAP whenever possible  Med list updated 5/15/19    INR History:  Date 8/23 9/26 10/24 11/22 12/19 1/23/18 2/21 3/20 4/24 5/21 6/20 7/24   Total Weekly Dose 70mg 70mg 70mg 70mg 70mg 70mg 70mg 70mg 70mg 70mg 70mg 70mg   INR 2.9 2.3 2.4 2.9 2.8 2.4 2.5 2.3 2.9 2.1 2.9 3.1   Notes                 Date 8/7 8/21 9/25 10/22 11/20 12/17 1/10/19 2/1 3/5 4/2 4/15 5/14   Total Weekly Dose 70mg 70mg 70mg 80mg??? 70mg 70mg 70mg 70mg 70mg 70mg 70mg 70mg   INR 2.1 2.3 2.4 3.1 3.03 1.89 2.31 2.14 2.21 1.80 2.34 2.54   Notes    extra dose?  incr GLV 1x incr dose          Date 6/13 7/15 8/15 9/11 10/15 11/13 12/10 1/9/20 1/21 2/12 3/11 4/16   Total Weekly Dose 70mg 70mg 70mg 70mg 70mg 70mg 70mg 70mg 70mg 70mg 70mg 70mg   INR 2.87 2.87 3.15 2.41 2.71 2.60 2.26 3.68 2.61 2.27 2.65 2.09   Notes  levothyrox start      sick   recv'd 3/12      Date 5/12 6/16 7/17 8/19 9/22 10/20 11/17 12/15 1/14/21 2/23 3/24 4/20   Total Weekly Dose 70mg 70mg 70mg 70mg 70 mg 70mg 70mg 70mg 70mg 70mg 70mg 70 mg   INR 2.64 2.27 2.24 2.06 3.05 3.09 2.70 2.33 2.21 2.45 2.56 2.24   Notes     recv'd 9/24 recv'd 10/22  recv'd 12/17         Date 5/17 5/18 6/10            Total Weekly Dose 70mg 70mg             INR 11.15 1.52 1.95            Notes                 Phone Interview:  Tablet Strength: 10mg tablets  Patient Contact Info: [phone] or [phone] **please speak with Geni or Dianne regarding results**  Lab Contact Info: [facility] [phone]  Verbal Release Authorization: On 12/12/19  patient gave permission via phone to speak with wife, Geni or daughter, Dianne about results.  **Patient prefers we do not ask interview questions if INR is in range - just verify dosing with daughter or spouse and give next repeat INR date**    Patient Findings    Negatives:  Signs/symptoms of thrombosis, Signs/symptoms of bleeding, Laboratory test error suspected, Change in health, Change in alcohol use, Change in activity, Upcoming invasive procedure, Emergency department visit, Upcoming dental procedure, Missed doses, Extra doses, Change in medications, Change in diet/appetite, Hospital admission, Bruising, Other complaints         Plan:  1. INR is slightly subtherapeutic today at 1.95.He missed rechecking on 5/24. Instructed Geni to have Mr. Ortega to boost Warfarin today to 15mg and continue 10mg dialy until recheck.  2. Repeat INR 5/24/21  3. Spoke with Geni who will relay this information to Mr. Ortega immediately. Also discussed s/sx of bleeding to be aware of and need to seek immediate medical attention if they occur or if he has any significant decreases.   4. Verbal information provided over the phone. Eliz Ortega's daughter, Geni, RBV dosing instructions, expresses understanding by teach back, and has no further questions at this time.  5. Going forward, Mr. Ortega requests the clinic does not ask him interview questions at each encounter. He agrees to call the clinic with any changes or upcoming surgeries/procedures. He also requests we talk to either Geni (spouse) or Dianne (daughter) when we call. They fill up his medication planner for him. He says it is ok for us to confirm dosing with Dianne or Geni and then give them next testing date. If out of range, Mr. Ortega was advised we will have to ask him interview questions.    Thank you,      6/10/2021  14:41 EDT

## 2021-06-10 NOTE — PROGRESS NOTES
Anticoagulation Clinic - Remote Progress Note  REMOTE LAB    Indication: chronic afib  Referring Provider: Heather (Last seen: 2/20/20  next appt: 3/4/21)  Goal INR: 2.0-3.0  Current Drug Interactions: aspirin, ranitidine, levothyroxine  CHADS-VASc: 1 (HTN)    Diet: GLV: green beans occasionally (5/17/21)  Alcohol: none  Tobacco: None 5/17/2021  OTC Pain Medication: Ibuprofen or APAP -- advised to minimize ibuprofen use, if at all, and to use APAP whenever possible  Med list updated 5/15/19    INR History:  Date 8/23 9/26 10/24 11/22 12/19 1/23/18 2/21 3/20 4/24 5/21 6/20 7/24   Total Weekly Dose 70mg 70mg 70mg 70mg 70mg 70mg 70mg 70mg 70mg 70mg 70mg 70mg   INR 2.9 2.3 2.4 2.9 2.8 2.4 2.5 2.3 2.9 2.1 2.9 3.1   Notes                 Date 8/7 8/21 9/25 10/22 11/20 12/17 1/10/19 2/1 3/5 4/2 4/15 5/14   Total Weekly Dose 70mg 70mg 70mg 80mg  ?? 70mg 70mg 70mg 70mg 70mg 70mg 70mg 70mg   INR 2.1 2.3 2.4 3.1 3.03 1.89 2.31 2.14 2.21 1.80 2.34 2.54   Notes    extra dose?  incr GLV 1x incr dose          Date 6/13 7/15 8/15 9/11 10/15 11/13 12/10 1/9/20 1/21 2/12 3/11 4/16   Total Weekly Dose 70mg 70mg 70mg 70mg 70mg 70mg 70mg 70mg 70mg 70mg 70mg 70mg   INR 2.87 2.87 3.15 2.41 2.71 2.60 2.26 3.68 2.61 2.27 2.65 2.09   Notes  levothyrox start      sick   recv'd 3/12      Date 5/12 6/16 7/17 8/19 9/22 10/20 11/17 12/15 1/14/21 2/23 3/24 4/20   Total Weekly Dose 70mg 70mg 70mg 70mg 70 mg 70mg 70mg 70mg 70mg 70mg 70mg 70 mg   INR 2.64 2.27 2.24 2.06 3.05 3.09 2.70 2.33 2.21 2.45 2.56 2.24   Notes     recv'd 9/24 recv'd 10/22  recv'd 12/17         Date 5/17 5/18 6/10            Total Weekly Dose 70mg 70mg 70mg            INR 11.15 1.52 1.95            Notes  1x hold 1x incr dose              Phone Interview:  Tablet Strength: 10mg tablets  Patient Contact Info: [phone] or [phone] **please speak with Geni or Dianne regarding results**  [facility] [phone]  Verbal Release Authorization:  On 12/12/19 patient gave permission via phone to speak with wife, Geni or daughter, Dianne about results.  **Patient prefers we do not ask interview questions if INR is in range - just verify dosing with daughter or spouse and give next repeat INR date**    Patient Findings  Negatives:  Signs/symptoms of thrombosis, Signs/symptoms of bleeding, Laboratory test error suspected, Change in health, Change in alcohol use, Change in activity, Upcoming invasive procedure, Emergency department visit, Upcoming dental procedure, Missed doses, Extra doses, Change in medications, Change in diet/appetite, Hospital admission, Bruising, Other complaints   Comments:  Geni denies findings for patient. She does report patient is still frustrated with critical SUPRAtherapeutic INR from 5/17. He is considering speaking to Dr. Romero about transitioning to DOAC. She is agreeable to contact clinic if Dr. Romero approves so that we may assist with transition. Otherwise denies findings.        Plan:  1. INR is is SUBtherapeutic today at 1.95. Patient was non-compliant with requested follow-up. Instructed Geni to have Mr. Ortega to INCREASE tonight's dose to 15mg and then continue 10mg oral daily until recheck.  2. Recommended repeat INR in 2 weeks, 6/24. She is confident patient will decline 2 week check and will instead recheck in ~4 weeks.  3. Verbal information provided over the phone. Eliz Ortega RBV dosing instructions, expresses understanding by teach back, and has no further questions at this time.  4. Going forward, Mr. Ortega requests the clinic does not ask him interview questions at each encounter. He agrees to call the clinic with any changes or upcoming surgeries/procedures. He also requests we talk to either Geni (spouse) or Dianne (daughter) when we call. They fill up his medication planner for him. He says it is ok for us to confirm dosing with Dianne or Geni and then give them next testing date. If out  of range, Mr. Ortega was advised we will have to ask him interview questions.    Fredrick Rosales, BOSSMAN  6/10/2021  14:56 EDT      I, Fredrick Mustafa, PharmD, have reviewed the note in full and agree with the assessment and plan.  06/10/21  15:40 EDT

## 2021-07-13 ENCOUNTER — ANTICOAGULATION VISIT (OUTPATIENT)
Dept: PHARMACY | Facility: HOSPITAL | Age: 61
End: 2021-07-13

## 2021-07-13 DIAGNOSIS — I48.20 CHRONIC ATRIAL FIBRILLATION (HCC): Primary | ICD-10-CM

## 2021-07-13 LAB — INR PPP: 2.28

## 2021-07-13 NOTE — PROGRESS NOTES
Anticoagulation Clinic - Remote Progress Note  REMOTE LAB    Indication: chronic afib  Referring Provider: Heather (Last seen: 2/20/20  next appt: 3/4/21)  Goal INR: 2.0-3.0  Current Drug Interactions: aspirin, ranitidine, levothyroxine  CHADS-VASc: 1 (HTN)    Diet: GLV: green beans occasionally (5/17/21)  Alcohol: none  Tobacco: None 5/17/2021  OTC Pain Medication: Ibuprofen or APAP -- advised to minimize ibuprofen use, if at all, and to use APAP whenever possible  Med list updated 5/15/19    INR History:  Date 8/23 9/26 10/24 11/22 12/19 1/23/18 2/21 3/20 4/24 5/21 6/20 7/24   Total Weekly Dose 70mg 70mg 70mg 70mg 70mg 70mg 70mg 70mg 70mg 70mg 70mg 70mg   INR 2.9 2.3 2.4 2.9 2.8 2.4 2.5 2.3 2.9 2.1 2.9 3.1   Notes                 Date 8/7 8/21 9/25 10/22 11/20 12/17 1/10/19 2/1 3/5 4/2 4/15 5/14   Total Weekly Dose 70mg 70mg 70mg 80mg  ?? 70mg 70mg 70mg 70mg 70mg 70mg 70mg 70mg   INR 2.1 2.3 2.4 3.1 3.03 1.89 2.31 2.14 2.21 1.80 2.34 2.54   Notes    extra dose?  incr GLV 1x incr dose          Date 6/13 7/15 8/15 9/11 10/15 11/13 12/10 1/9/20 1/21 2/12 3/11 4/16   Total Weekly Dose 70mg 70mg 70mg 70mg 70mg 70mg 70mg 70mg 70mg 70mg 70mg 70mg   INR 2.87 2.87 3.15 2.41 2.71 2.60 2.26 3.68 2.61 2.27 2.65 2.09   Notes  levothyrox start      sick   recv'd 3/12      Date 5/12 6/16 7/17 8/19 9/22 10/20 11/17 12/15 1/14/21 2/23 3/24 4/20   Total Weekly Dose 70mg 70mg 70mg 70mg 70 mg 70mg 70mg 70mg 70mg 70mg 70mg 70 mg   INR 2.64 2.27 2.24 2.06 3.05 3.09 2.70 2.33 2.21 2.45 2.56 2.24   Notes     recv'd 9/24 recv'd 10/22  recv'd 12/17         Date 5/17 5/18 6/10 7/13           Total Weekly Dose 70mg 70mg 70mg 70 mg           INR 11.15 1.52 1.95 2.28           Notes  1x hold 1x incr dose              Phone Interview:  Tablet Strength: 10mg tablets  Patient Contact Info: [phone] or [phone] **please speak with Geni or Dianne regarding results**  Lab Contact Info: [facility] [phone]  Verbal Release  Authorization: On 12/12/19 patient gave permission via phone to speak with wife, Geni or daughter, Dianne about results.  **Patient prefers we do not ask interview questions if INR is in range - just verify dosing with daughter or spouse and give next repeat INR date**    Patient Findings:  Negatives:  Signs/symptoms of thrombosis, Signs/symptoms of bleeding, Laboratory test error suspected, Change in health, Change in alcohol use, Change in activity, Upcoming invasive procedure, Emergency department visit, Upcoming dental procedure, Missed doses, Extra doses, Change in medications, Change in diet/appetite, Hospital admission, Bruising, Other complaints     Plan:  1. INR is back WNL today at 2.28. Patient was non-compliant with requested follow-up. Instructed Geni to have Mr. Ortega to continue 10 mg oral daily until recheck.  2. Repeat INR in 4 weeks, 8/10.  3. Verbal information provided over the phone. Eliz Ortega RBV dosing instructions, expresses understanding by teach back, and has no further questions at this time.  4. Going forward, Mr. Ortega requests the clinic does not ask him interview questions at each encounter. He agrees to call the clinic with any changes or upcoming surgeries/procedures. He also requests we talk to either Geni (spouse) or Dianne (daughter) when we call. They fill up his medication planner for him. He says it is ok for us to confirm dosing with Dianne or Geni and then give them next testing date. If out of range, Mr. Ortega was advised we will have to ask him interview questions.    Melissa Sanchez, Zoila  7/13/2021  14:09 EDT    I, Fredrick Mustafa, PharmD, have reviewed the note in full and agree with the assessment and plan.  07/16/21  14:46 EDT

## 2021-08-12 ENCOUNTER — ANTICOAGULATION VISIT (OUTPATIENT)
Dept: PHARMACY | Facility: HOSPITAL | Age: 61
End: 2021-08-12

## 2021-08-12 DIAGNOSIS — I48.20 CHRONIC ATRIAL FIBRILLATION (HCC): Primary | ICD-10-CM

## 2021-08-12 LAB — INR PPP: 2.41

## 2021-08-12 NOTE — PROGRESS NOTES
Anticoagulation Clinic - Remote Progress Note  REMOTE LAB    Indication: chronic afib  Referring Provider: Heather (Last seen: 3/4/21  next appt: 3/10/22)  Goal INR: 2.0-3.0  Current Drug Interactions: aspirin, ranitidine, levothyroxine  CHADS-VASc: 1 (HTN)    Diet: GLV: green beans occasionally (5/17/21)  Alcohol: none  Tobacco: None 5/17/2021  OTC Pain Medication: Ibuprofen or APAP -- advised to minimize ibuprofen use, if at all, and to use APAP whenever possible  Med list updated 5/15/19    INR History:  Date 8/23 9/26 10/24 11/22 12/19 1/23/18 2/21 3/20 4/24 5/21 6/20 7/24   Total Weekly Dose 70mg 70mg 70mg 70mg 70mg 70mg 70mg 70mg 70mg 70mg 70mg 70mg   INR 2.9 2.3 2.4 2.9 2.8 2.4 2.5 2.3 2.9 2.1 2.9 3.1   Notes                 Date 8/7 8/21 9/25 10/22 11/20 12/17 1/10/19 2/1 3/5 4/2 4/15 5/14   Total Weekly Dose 70mg 70mg 70mg 80mg  ?? 70mg 70mg 70mg 70mg 70mg 70mg 70mg 70mg   INR 2.1 2.3 2.4 3.1 3.03 1.89 2.31 2.14 2.21 1.80 2.34 2.54   Notes    extra dose?  incr GLV 1x incr dose          Date 6/13 7/15 8/15 9/11 10/15 11/13 12/10 1/9/20 1/21 2/12 3/11 4/16   Total Weekly Dose 70mg 70mg 70mg 70mg 70mg 70mg 70mg 70mg 70mg 70mg 70mg 70mg   INR 2.87 2.87 3.15 2.41 2.71 2.60 2.26 3.68 2.61 2.27 2.65 2.09   Notes  levothyrox start      sick   recv'd 3/12      Date 5/12 6/16 7/17 8/19 9/22 10/20 11/17 12/15 1/14/21 2/23 3/24 4/20   Total Weekly Dose 70mg 70mg 70mg 70mg 70 mg 70mg 70mg 70mg 70mg 70mg 70mg 70 mg   INR 2.64 2.27 2.24 2.06 3.05 3.09 2.70 2.33 2.21 2.45 2.56 2.24   Notes     recv'd 9/24 recv'd 10/22  recv'd 12/17         Date 5/17 5/18 6/10 7/13 8/12          Total Weekly Dose 70mg 70mg 70mg 70 mg 70 mg          INR 11.15 1.52 1.95 2.28 2.41          Notes  1x hold 1x incr dose              Phone Interview:  Tablet Strength: 10mg tablets  Patient Contact Info: [phone] or [phone] **please speak with Geni or Dianne regarding results**  Lab Contact Info: [facility]  [phone]  Verbal Release Authorization: On 12/12/19 patient gave permission via phone to speak with wife, Geni or daughter, Dianne about results.  **Patient prefers we do not ask interview questions if INR is in range - just verify dosing with daughter or spouse and give next repeat INR date**    Patient Findings:  Negatives:  Signs/symptoms of thrombosis, Signs/symptoms of bleeding, Laboratory test error suspected, Change in health, Change in alcohol use, Change in activity, Upcoming invasive procedure, Emergency department visit, Upcoming dental procedure, Missed doses, Extra doses, Change in medications, Change in diet/appetite, Hospital admission, Bruising, Other complaints     Plan:  1. INR is therapeutic today at 2.41 (goal 2-3). Instructed Geni to have her  continue 10 mg oral daily until recheck.  2. Repeat INR in 4 weeks, 9/9.  3. Verbal information provided over the phone. Eliz Ortega RBV dosing instructions, expresses understanding by teach back, and has no further questions at this time.  4. Going forward, Mr. Ortega requests the clinic does not ask him interview questions at each encounter. He agrees to call the clinic with any changes or upcoming surgeries/procedures. He also requests we talk to either Geni (spouse) or Dianne (daughter) when we call. They fill up his medication planner for him. He says it is ok for us to confirm dosing with Dianne or Geni and then give them next testing date. If out of range, Mr. Ortega was advised we will have to ask him interview questions.    Melissa Sanchez CPhT  8/12/2021  13:18 EDT    I, Taylor Riedel, [facility], have reviewed the note in full and agree with the assessment and plan.  08/12/21  13:51 EDT

## 2021-09-15 ENCOUNTER — ANTICOAGULATION VISIT (OUTPATIENT)
Dept: PHARMACY | Facility: HOSPITAL | Age: 61
End: 2021-09-15

## 2021-09-15 DIAGNOSIS — I48.20 CHRONIC ATRIAL FIBRILLATION (HCC): Primary | ICD-10-CM

## 2021-09-15 LAB — INR PPP: 3.35

## 2021-09-15 NOTE — PROGRESS NOTES
Anticoagulation Clinic - Remote Progress Note  REMOTE LAB    Indication: chronic afib  Referring Provider: Heather (Last seen: 3/4/21  next appt: 3/10/22)  Goal INR: 2.0-3.0  Current Drug Interactions: aspirin, ranitidine, levothyroxine  CHADS-VASc: 1 (HTN)    Diet: GLV: green beans occasionally (5/17/21)  Alcohol: none  Tobacco: None 5/17/2021  OTC Pain Medication: Ibuprofen or APAP -- advised to minimize ibuprofen use, if at all, and to use APAP whenever possible  Med list updated 5/15/19    INR History:    Date 6/13 7/15 8/15 9/11 10/15 11/13 12/10 1/9/20 1/21 2/12 3/11 4/16   Total Weekly Dose 70mg 70mg 70mg 70mg 70mg 70mg 70mg 70mg 70mg 70mg 70mg 70mg   INR 2.87 2.87 3.15 2.41 2.71 2.60 2.26 3.68 2.61 2.27 2.65 2.09   Notes  levothyrox start      sick   recv'd 3/12      Date 5/12 6/16 7/17 8/19 9/22 10/20 11/17 12/15 1/14/21 2/23 3/24 4/20   Total Weekly Dose 70mg 70mg 70mg 70mg 70 mg 70mg 70mg 70mg 70mg 70mg 70mg 70 mg   INR 2.64 2.27 2.24 2.06 3.05 3.09 2.70 2.33 2.21 2.45 2.56 2.24   Notes     recv'd 9/24 recv'd 10/22  recv'd 12/17         Date 5/17 5/18 6/10 7/13 8/12 9/15         Total Weekly Dose 70mg 70mg 70mg 70 mg 70 mg 70mg         INR 11.15 1.52 1.95 2.28 2.41 3.35         Notes  1x hold 1x incr dose              Phone Interview:  Tablet Strength: 10mg tablets  Patient Contact Info: [phone] or [phone] **please speak with Geni or Dianne regarding results**  Lab Contact Info: [facility] [phone]  Verbal Release Authorization: On 12/12/19 patient gave permission via phone to speak with wife, Geni or daughterDianne about results.  **Patient prefers we do not ask interview questions if INR is in range - just verify dosing with daughter or spouse and give next repeat INR date**    Patient Findings:    Negatives:  Signs/symptoms of thrombosis, Signs/symptoms of bleeding, Laboratory test error suspected, Change in health, Change in alcohol use, Change in activity, Upcoming  "invasive procedure, Emergency department visit, Upcoming dental procedure, Missed doses, Extra doses, Change in medications, Change in diet/appetite, Hospital admission, Bruising, Other complaints   Comments:  Per Geni, all neg. Has a slight \"head cold\" but appetite unaffected and not taking OTC medications         Plan:  1. INR was  supratherapeutic yesterday at 3.35 (goal 2-3). Instructed Geni to have her  reduce tonight's dose to 5mg then  continue 10 mg oral daily until recheck.  2. Repeat INR in 2 weeks, 9/28 to ensure WNL, then can resume q4w  3. Verbal information provided over the phone. Eliz Ortega RBV dosing instructions, expresses understanding by teach back, and has no further questions at this time.  4. Going forward, Mr. Ortega requests the clinic does not ask him interview questions at each encounter. He agrees to call the clinic with any changes or upcoming surgeries/procedures. He also requests we talk to either Geni (spouse) or Dianne (daughter) when we call. They fill up his medication planner for him. He says it is ok for us to confirm dosing with Dianne or Geni and then give them next testing date. If out of range, Mr. Ortega was advised we will have to ask him interview questions.    Mercedes Alas, AnsonD.  09/15/21   08:42 EDT      "

## 2021-10-12 ENCOUNTER — TELEPHONE (OUTPATIENT)
Dept: PHARMACY | Facility: HOSPITAL | Age: 61
End: 2021-10-12

## 2021-10-12 NOTE — TELEPHONE ENCOUNTER
Spoke with daughter re: patient overdue PT/INR. 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 She is agreeable to speak with him tonight and remind him.

## 2021-10-20 ENCOUNTER — ANTICOAGULATION VISIT (OUTPATIENT)
Dept: PHARMACY | Facility: HOSPITAL | Age: 61
End: 2021-10-20

## 2021-10-20 DIAGNOSIS — I48.20 CHRONIC ATRIAL FIBRILLATION (HCC): Primary | ICD-10-CM

## 2021-10-20 LAB — INR PPP: 3

## 2021-10-20 NOTE — PROGRESS NOTES
Anticoagulation Clinic - Remote Progress Note  REMOTE LAB    Indication: chronic afib  Referring Provider: Heather (Last seen: 3/4/21  next appt: 3/10/22)  Goal INR: 2.0-3.0  Current Drug Interactions: aspirin, ranitidine, levothyroxine  CHADS-VASc: 1 (HTN)    Diet: GLV: green beans occasionally (5/17/21)  Alcohol: none  Tobacco: None 5/17/2021  OTC Pain Medication: Ibuprofen or APAP -- advised to minimize ibuprofen use, if at all, and to use APAP whenever possible  Med list updated 5/15/19    INR History:    Date 6/13 7/15 8/15 9/11 10/15 11/13 12/10 1/9/20 1/21 2/12 3/11 4/16   Total Weekly Dose 70mg 70mg 70mg 70mg 70mg 70mg 70mg 70mg 70mg 70mg 70mg 70mg   INR 2.87 2.87 3.15 2.41 2.71 2.60 2.26 3.68 2.61 2.27 2.65 2.09   Notes  levothyrox start      sick   recv'd 3/12      Date 5/12 6/16 7/17 8/19 9/22 10/20 11/17 12/15 1/14/21 2/23 3/24 4/20   Total Weekly Dose 70mg 70mg 70mg 70mg 70 mg 70mg 70mg 70mg 70mg 70mg 70mg 70 mg   INR 2.64 2.27 2.24 2.06 3.05 3.09 2.70 2.33 2.21 2.45 2.56 2.24   Notes     recv'd 9/24 recv'd 10/22  recv'd 12/17         Date 5/17 5/18 6/10 7/13 8/12 9/15 10/15        Total Weekly Dose 70mg 70mg 70mg 70 mg 70 mg 70mg 70mg        INR 11.15 1.52 1.95 2.28 2.41 3.35 3.0        Notes  1x hold 1x incr dose    recv'd 10/20          Phone Interview:  Tablet Strength: 10mg tablets  Patient Contact Info: [phone] or [phone] **please speak with Geni or Dianne regarding results**  Lab Contact Info: [facility] [phone]  Verbal Release Authorization: On 12/12/19 patient gave permission via phone to speak with wife, Geni or daughterDianne about results.  **Patient prefers we do not ask interview questions if INR is in range - just verify dosing with daughter or spouse and give next repeat INR date**    Patient Findings  Negatives:  Signs/symptoms of thrombosis, Signs/symptoms of bleeding, Laboratory test error suspected, Change in health, Change in alcohol use, Change in  activity, Upcoming invasive procedure, Emergency department visit, Upcoming dental procedure, Missed doses, Extra doses, Change in medications, Change in diet/appetite, Hospital admission, Bruising, Other complaints     Plan:  1. INR was therapeutic at 3.0 on 10/15; results received 10/20 (goal 2.0-3.0).Patient was non-compliant with requested follow-up. Instructed patient to continue warfarin 10mg oral daily until recheck.  2. Repeat INR in ~4 weeks, 11/12  3. Verbal information provided over the phone. Eliz Ortega RBV dosing instructions, expresses understanding by teach back, and has no further questions at this time.  4. Going forward, Mr. Ortega requests the clinic does not ask him interview questions at each encounter. He agrees to call the clinic with any changes or upcoming surgeries/procedures. He also requests we talk to either Geni (spouse) or Dianne (daughter) when we call. They fill up his medication planner for him. He says it is ok for us to confirm dosing with Dianne or Geni and then give them next testing date. If out of range, Mr. Ortega was advised we will have to ask him interview questions.    Fredrick Rosales, Zoila  10/20/2021  16:18 EDT     I, Uyen Poon, PharmD, have reviewed the note in full and agree with the assessment and plan.  10/21/21  10:12 EDT

## 2021-11-11 ENCOUNTER — ANTICOAGULATION VISIT (OUTPATIENT)
Dept: PHARMACY | Facility: HOSPITAL | Age: 61
End: 2021-11-11

## 2021-11-11 DIAGNOSIS — I48.20 CHRONIC ATRIAL FIBRILLATION (HCC): Primary | ICD-10-CM

## 2021-11-11 DIAGNOSIS — I48.91 ATRIAL FIBRILLATION, UNSPECIFIED TYPE (HCC): Primary | ICD-10-CM

## 2021-11-11 LAB — INR PPP: 1.79

## 2021-11-11 NOTE — PROGRESS NOTES
Anticoagulation Clinic - Remote Progress Note  REMOTE LAB    Indication: chronic afib  Referring Provider: Heather (Last seen: 3/4/21  next appt: 3/10/22)  Goal INR: 2.0-3.0  Current Drug Interactions: aspirin, ranitidine, levothyroxine  CHADS-VASc: 1 (HTN)    Diet: GLV: green beans occasionally (5/17/21)  Alcohol: none  Tobacco: None 5/17/2021  OTC Pain Medication: Ibuprofen or APAP -- advised to minimize ibuprofen use, if at all, and to use APAP whenever possible  Med list updated 5/15/19    INR History:    Date 6/13 7/15 8/15 9/11 10/15 11/13 12/10 1/9/20 1/21 2/12 3/11 4/16   Total Weekly Dose 70mg 70mg 70mg 70mg 70mg 70mg 70mg 70mg 70mg 70mg 70mg 70mg   INR 2.87 2.87 3.15 2.41 2.71 2.60 2.26 3.68 2.61 2.27 2.65 2.09   Notes  levothyrox start      sick   recv'd 3/12      Date 5/12 6/16 7/17 8/19 9/22 10/20 11/17 12/15 1/14/21 2/23 3/24 4/20   Total Weekly Dose 70mg 70mg 70mg 70mg 70 mg 70mg 70mg 70mg 70mg 70mg 70mg 70 mg   INR 2.64 2.27 2.24 2.06 3.05 3.09 2.70 2.33 2.21 2.45 2.56 2.24   Notes     recv'd 9/24 recv'd 10/22  recv'd 12/17         Date 5/17 5/18 6/10 7/13 8/12 9/15 10/15 11/11       Total Weekly Dose 70mg 70mg 70mg 70 mg 70 mg 70mg 70mg 70mg       INR 11.15 1.52 1.95 2.28 2.41 3.35 3.0 1.79       Notes  1x hold 1x incr dose    recv'd 10/20          Phone Interview:  Tablet Strength: 10mg tablets  Patient Contact Info: [phone] or [phone] **please speak with Geni or Dianne regarding results**  Lab Contact Info: [facility] [phone]  Verbal Release Authorization: On 12/12/19 patient gave permission via phone to speak with wife, Geni or daughterDianne about results.  **Patient prefers we do not ask interview questions if INR is in range - just verify dosing with daughter or spouse and give next repeat INR date**    Patient Findings      Negatives:  Signs/symptoms of thrombosis, Signs/symptoms of bleeding, Laboratory test error suspected, Change in health, Change in  alcohol use, Change in activity, Upcoming invasive procedure, Emergency department visit, Upcoming dental procedure, Missed doses, Extra doses, Change in medications, Change in diet/appetite, Hospital admission, Bruising, Other complaints         Plan:  1. INR was SUBtherapeutic at 1.79 (goal 2.0-3.0).Patient was non-compliant with requested follow-up. Instructed patient to boost tonight's dose to 15mg then continue warfarin 10mg oral daily until recheck.  2. Repeat INR in 3 weeks  3. Verbal information provided over the phone. Eliz Ortega RBV dosing instructions, expresses understanding by teach back, and has no further questions at this time.  4. Going forward, Mr. Ortega requests the clinic does not ask him interview questions at each encounter. He agrees to call the clinic with any changes or upcoming surgeries/procedures. He also requests we talk to either Geni (spouse) or Dianne (daughter) when we call. They fill up his medication planner for him. He says it is ok for us to confirm dosing with Dianne or Geni and then give them next testing date. If out of range, Mr. Ortega was advised we will have to ask him interview questions.      Mercedes Alas, PharmD.  11/11/21   16:30 EST

## 2021-12-07 LAB — INR PPP: 4.12

## 2021-12-08 ENCOUNTER — ANTICOAGULATION VISIT (OUTPATIENT)
Dept: PHARMACY | Facility: HOSPITAL | Age: 61
End: 2021-12-08

## 2021-12-08 DIAGNOSIS — I48.91 ATRIAL FIBRILLATION, UNSPECIFIED TYPE (HCC): ICD-10-CM

## 2021-12-08 DIAGNOSIS — I48.20 CHRONIC ATRIAL FIBRILLATION (HCC): Primary | ICD-10-CM

## 2021-12-08 RX ORDER — WARFARIN SODIUM 10 MG/1
TABLET ORAL
Qty: 90 TABLET | Refills: 3 | Status: SHIPPED | OUTPATIENT
Start: 2021-12-08 | End: 2022-03-10 | Stop reason: ALTCHOICE

## 2021-12-08 NOTE — PROGRESS NOTES
Anticoagulation Clinic - Remote Progress Note  REMOTE LAB    Indication: chronic afib  Referring Provider: Heather (Last seen: 3/4/21  next appt: 3/10/22)  Goal INR: 2.0-3.0  Current Drug Interactions: aspirin, ranitidine, levothyroxine  CHADS-VASc: 1 (HTN)    Diet: GLV: green beans occasionally (5/17/21)  Alcohol: none  Tobacco: None 5/17/2021  OTC Pain Medication: Ibuprofen or APAP -- advised to minimize ibuprofen use, if at all, and to use APAP whenever possible  Med list updated 5/15/19    INR History:    Date 6/13 7/15 8/15 9/11 10/15 11/13 12/10 1/9/20 1/21 2/12 3/11 4/16   Total Weekly Dose 70mg 70mg 70mg 70mg 70mg 70mg 70mg 70mg 70mg 70mg 70mg 70mg   INR 2.87 2.87 3.15 2.41 2.71 2.60 2.26 3.68 2.61 2.27 2.65 2.09   Notes  levothyrox start      sick   recv'd 3/12      Date 5/12 6/16 7/17 8/19 9/22 10/20 11/17 12/15 1/14/21 2/23 3/24 4/20   Total Weekly Dose 70mg 70mg 70mg 70mg 70 mg 70mg 70mg 70mg 70mg 70mg 70mg 70 mg   INR 2.64 2.27 2.24 2.06 3.05 3.09 2.70 2.33 2.21 2.45 2.56 2.24   Notes     recv'd 9/24 recv'd 10/22  recv'd 12/17         Date 5/17 5/18 6/10 7/13 8/12 9/15 10/15 11/11 12/7      Total Weekly Dose 70mg 70mg 70mg 70 mg 70 mg 70mg 70mg 70mg 70 mg      INR 11.15 1.52 1.95 2.28 2.41 3.35 3.0 1.79 4.12      Notes  1x hold 1x incr dose    recv'd 10/20  ??  Rec'd 12/8        Phone Interview:  Tablet Strength: 10mg tablets  Patient Contact Info: [phone] or [phone] **please speak with Geni or Dianne regarding results**  Lab Contact Info: [facility] - [phone]  Verbal Release Authorization: On 12/12/19 patient gave permission via phone to speak with wife, Geni or daughterDianne about results.  **Patient prefers we do not ask interview questions if INR is in range - just verify dosing with daughter or spouse and give next repeat INR date**    Patient Findings    Positives:  Change in health, Change in diet/appetite   Negatives:  Signs/symptoms of thrombosis, Signs/symptoms  of bleeding, Laboratory test error suspected, Change in alcohol use, Change in activity, Upcoming invasive procedure, Emergency department visit, Upcoming dental procedure, Missed doses, Extra doses, Change in medications, Hospital admission, Bruising, Other complaints   Comments:  Per spouse Geni patient had been sick week before last around Thanksgiving, but is feeling better now. Patient was not eating much when he was sick, diet returned to normal now. Discussed that if this occurred two weeks ago should not have any effect at this point. No changes in medications.       Plan:  1. INR was SUPRAtherapeutic yesterday at 4.12 (goal 2.0-3.0), result received today. Patient was non-compliant with requested follow-up. No clear cause of why INR is elevated today, patient had been sick but has been feeling better and eating normally for over a week. Instructed Geni to hold tonight's dose and then continue warfarin 10mg oral daily until recheck.  2. Repeat INR in ~1.5 weeks, 12/17.  3. Verbal information provided over the phone. Eliz Ortega RBV dosing instructions, expresses understanding by teach back, and has no further questions at this time.  4. Refill requested to be sent in for patient. Have sent this to [facility] in [place].   5. Going forward, Mr. Ortega requests the clinic does not ask him interview questions at each encounter. He agrees to call the clinic with any changes or upcoming surgeries/procedures. He also requests we talk to either Geni (spouse) or Dianne (daughter) when we call. They fill up his medication planner for him. He says it is ok for us to confirm dosing with Dianne or Geni and then give them next testing date. If out of range, Mr. Ortega was advised we will have to ask him interview questions.      Dustin Flor, PharmD, BCPS  12/8/2021  08:39 EST

## 2021-12-16 ENCOUNTER — ANTICOAGULATION VISIT (OUTPATIENT)
Dept: PHARMACY | Facility: HOSPITAL | Age: 61
End: 2021-12-16

## 2021-12-16 DIAGNOSIS — I48.20 CHRONIC ATRIAL FIBRILLATION (HCC): Primary | ICD-10-CM

## 2021-12-16 LAB — INR PPP: 2.08

## 2021-12-16 NOTE — PROGRESS NOTES
Anticoagulation Clinic - Remote Progress Note  REMOTE LAB    Indication: chronic afib  Referring Provider: Heather (Last seen: 3/4/21  next appt: 3/10/22)  Goal INR: 2.0-3.0  Current Drug Interactions: aspirin, ranitidine, levothyroxine  CHADS-VASc: 1 (HTN)    Diet: GLV: green beans occasionally (5/17/21)  Alcohol: none  Tobacco: None 5/17/2021  OTC Pain Medication: Ibuprofen or APAP -- advised to minimize ibuprofen use, if at all, and to use APAP whenever possible  Med list updated 5/15/19    INR History:  Date 6/13 7/15 8/15 9/11 10/15 11/13 12/10 1/9/20 1/21 2/12 3/11 4/16   Total Weekly Dose 70mg 70mg 70mg 70mg 70mg 70mg 70mg 70mg 70mg 70mg 70mg 70mg   INR 2.87 2.87 3.15 2.41 2.71 2.60 2.26 3.68 2.61 2.27 2.65 2.09   Notes  levothyrox start      sick   recv'd 3/12      Date 5/12 6/16 7/17 8/19 9/22 10/20 11/17 12/15 1/14/21 2/23 3/24 4/20   Total Weekly Dose 70mg 70mg 70mg 70mg 70 mg 70mg 70mg 70mg 70mg 70mg 70mg 70 mg   INR 2.64 2.27 2.24 2.06 3.05 3.09 2.70 2.33 2.21 2.45 2.56 2.24   Notes     recv'd 9/24 recv'd 10/22  recv'd 12/17         Date 5/17 5/18 6/10 7/13 8/12 9/15 10/15 11/11 12/7  12/14     Total Weekly Dose 70mg 70mg 70mg 70 mg 70 mg 70mg 70mg 70mg 70 mg  60 mg     INR 11.15 1.52 1.95 2.28 2.41 3.35 3.0 1.79 4.12  2.08     Notes  1x hold 1x incr dose    recv'd 10/20  Illness 2 wks prior  Rec'd 12/8 rec 12/16  holdx1       Phone Interview:  Tablet Strength: 10mg tablets  Patient Contact Info: [phone] or [phone] **please speak with Geni or Dianne regarding results**  Lab Contact Info: [facility] [phone]  Verbal Release Authorization: On 12/12/19 patient gave permission via phone to speak with wife, Geni or daughterDianne about results.  **Patient prefers we do not ask interview questions if INR is in range - just verify dosing with daughter or spouse and give next repeat INR date**    Patient Findings  Positives:  Other complaints   Negatives:  Signs/symptoms of  thrombosis, Signs/symptoms of bleeding, Laboratory test error suspected, Change in health, Change in alcohol use, Change in activity, Upcoming invasive procedure, Emergency department visit, Upcoming dental procedure, Missed doses, Extra doses, Change in medications, Change in diet/appetite, Hospital admission, Bruising   Comments:  Geni stated that he is more tired right now 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     He has gained some weight back after being sick and losing weight. Geni feels like he has resumed his usual diet.    Otherwise, above findings negative     Plan:  1. INR was therapeutic on 12/14 at 2.08 (goal 2.0-3.0).  Result received today, 12/16. Instructed Geni to continue warfarin 10mg oral daily until recheck.  2. Repeat INR in three weeks, on 1/4.  3. Verbal information provided over the phone. Eliz Ortega RBV dosing instructions, expresses understanding by teach back, and has no further questions at this time.  4. Please note:   Mr. Ortega requests the clinic does not ask him interview questions at each encounter. He agrees to call the clinic with any changes or upcoming surgeries/procedures. He also requests we talk to either Geni (spouse) or Dianne (daughter) when we call. They fill up his medication planner for him. He says it is ok for us to confirm dosing with Dianne or Geni and then give them next testing date. If out of range, Mr. Ortega was advised we will have to ask him interview questions.      Maricarmen Grider, PharmD  12/16/2021  10:31 EST

## 2022-01-12 ENCOUNTER — ANTICOAGULATION VISIT (OUTPATIENT)
Dept: PHARMACY | Facility: HOSPITAL | Age: 62
End: 2022-01-12

## 2022-01-12 DIAGNOSIS — I48.20 CHRONIC ATRIAL FIBRILLATION: Primary | ICD-10-CM

## 2022-01-12 LAB — INR PPP: 2.11

## 2022-01-12 NOTE — PROGRESS NOTES
Anticoagulation Clinic - Remote Progress Note  REMOTE LAB    Indication: chronic afib  Referring Provider: Heather (Last seen: 3/4/21  next appt: 3/10/22)  Goal INR: 2.0-3.0  Current Drug Interactions: aspirin, ranitidine, levothyroxine  CHADS-VASc: 1 (HTN)    Diet: GLV: green beans occasionally (5/17/21)  Alcohol: none  Tobacco: None 5/17/2021  OTC Pain Medication: Ibuprofen or APAP -- advised to minimize ibuprofen use, if at all, and to use APAP whenever possible  Med list updated 5/15/19    INR History:  Date 6/13 7/15 8/15 9/11 10/15 11/13 12/10 1/9/20 1/21 2/12 3/11 4/16   Total Weekly Dose 70mg 70mg 70mg 70mg 70mg 70mg 70mg 70mg 70mg 70mg 70mg 70mg   INR 2.87 2.87 3.15 2.41 2.71 2.60 2.26 3.68 2.61 2.27 2.65 2.09   Notes  levothyrox start      sick   recv'd 3/12      Date 5/12 6/16 7/17 8/19 9/22 10/20 11/17 12/15 1/14/21 2/23 3/24 4/20   Total Weekly Dose 70mg 70mg 70mg 70mg 70 mg 70mg 70mg 70mg 70mg 70mg 70mg 70 mg   INR 2.64 2.27 2.24 2.06 3.05 3.09 2.70 2.33 2.21 2.45 2.56 2.24   Notes     recv'd 9/24 recv'd 10/22  recv'd 12/17         Date 5/17 5/18 6/10 7/13 8/12 9/15 10/15 11/11 12/7  12/14 1/11/22    Total Weekly Dose 70mg 70mg 70mg 70 mg 70 mg 70mg 70mg 70mg 70 mg  60 mg 70 mg    INR 11.15 1.52 1.95 2.28 2.41 3.35 3.0 1.79 4.12  2.08 2.11    Notes  1x hold 1x incr dose    recv'd 10/20  Illness 2 wks prior  Rec'd 12/8 rec 12/16  holdx1       Phone Interview:  Tablet Strength: 10mg tablets  Patient Contact Info: [phone] or [phone] **please speak with Geni or Dianne regarding results**  Lab Contact Info: [facility] [phone]  Verbal Release Authorization: On 12/12/19 patient gave permission via phone to speak with wife, Geni or daughter, Dianne about results.  **Patient prefers we do not ask interview questions if INR is in range - just verify dosing with daughter or spouse and give next repeat INR date**    Patient Findings:  Negatives:  Signs/symptoms of thrombosis,  Signs/symptoms of bleeding, Laboratory test error suspected, Change in health, Change in alcohol use, Change in activity, Upcoming invasive procedure, Emergency department visit, Upcoming dental procedure, Missed doses, Extra doses, Change in medications, Change in diet/appetite, Hospital admission, Bruising, Other complaints   Comments:  Geni, patient spouse, denies any changes with her .     Plan:  1. INR was therapeutic yesterday at 2.11 however result received today. Instructed Geni to have Mr. Ortega continue warfarin 10 mg oral daily until recheck.  2. Repeat INR in 4 weeks, 2/9/22.  3. Verbal information provided over the phone. Eliz Ortega RBV dosing instructions, expresses understanding by teach back, and has no further questions at this time.  4. Please note:   Mr. Ortega requests the clinic does not ask him interview questions at each encounter. He agrees to call the clinic with any changes or upcoming surgeries/procedures. He also requests we talk to either Geni (spouse) or Dianne (daughter) when we call. They fill up his medication planner for him. He says it is ok for us to confirm dosing with Dianne or Geni and then give them next testing date. If out of range, Mr. Ortega was advised we will have to ask him interview questions.    Melissa Sanchez, CP  1/12/2022  11:11 Mercedes LOU, PharmD, have reviewed the note in full and agree with the assessment and plan.  01/12/22  12:10 EST

## 2022-02-17 ENCOUNTER — ANTICOAGULATION VISIT (OUTPATIENT)
Dept: PHARMACY | Facility: HOSPITAL | Age: 62
End: 2022-02-17

## 2022-02-17 DIAGNOSIS — I48.20 CHRONIC ATRIAL FIBRILLATION: Primary | ICD-10-CM

## 2022-02-17 LAB — INR PPP: 1.99

## 2022-02-17 NOTE — PROGRESS NOTES
Anticoagulation Clinic - Remote Progress Note  REMOTE LAB    Indication: chronic afib  Referring Provider: Heather (Last seen: 3/4/21  next appt: 3/10/22)  Goal INR: 2.0-3.0  Current Drug Interactions: aspirin, ranitidine, levothyroxine  CHADS-VASc: 1 (HTN)    Diet: GLV: green beans occasionally (5/17/21)  Alcohol: none  Tobacco: None 5/17/2021  OTC Pain Medication: Ibuprofen or APAP -- advised to minimize ibuprofen use, if at all, and to use APAP whenever possible  Med list updated 5/15/19    INR History:  Date 6/13 7/15 8/15 9/11 10/15 11/13 12/10 1/9/20 1/21 2/12 3/11 4/16   Total Weekly Dose 70mg 70mg 70mg 70mg 70mg 70mg 70mg 70mg 70mg 70mg 70mg 70mg   INR 2.87 2.87 3.15 2.41 2.71 2.60 2.26 3.68 2.61 2.27 2.65 2.09   Notes  levothyrox start      sick   recv'd 3/12      Date 5/12 6/16 7/17 8/19 9/22 10/20 11/17 12/15 1/14/21 2/23 3/24 4/20   Total Weekly Dose 70mg 70mg 70mg 70mg 70 mg 70mg 70mg 70mg 70mg 70mg 70mg 70 mg   INR 2.64 2.27 2.24 2.06 3.05 3.09 2.70 2.33 2.21 2.45 2.56 2.24   Notes     recv'd 9/24 recv'd 10/22  recv'd 12/17         Date 5/17 5/18 6/10 7/13 8/12 9/15 10/15 11/11 12/7 12/14 1/11/22 2/16   Total Weekly Dose 70mg 70mg 70mg 70mg 70mg 70mg 70mg 70mg 70mg 60mg 70mg 70mg   INR 11.15 1.52 1.95 2.28 2.41 3.35 3.0 1.79 4.12 2.08 2.11 1.99   Notes  1x hold 1x incr dose    recv'd 10/20  Illness 2 wks prior  recv'd 12/8 1x hold; recv'd 12/16  incr GLV?     Date               Total WeeklyDose               INR               Notes                   Phone Interview:  Tablet Strength: 10mg tablets  Patient Contact Info: [phone] or [phone] **please speak with Terri or Dianne regarding results**  Lab Contact Info: [facility] [phone]  Verbal Release Authorization: On 12/12/19 patient gave permission via phone to speak with wife, Terri or daughter, Dianne about results.  **Patient prefers we do not ask interview questions if INR is in range - just verify dosing with  daughter or spouse and give next repeat INR date**    Patient Findings  Positives:  Change in diet/appetite   Negatives:  Signs/symptoms of thrombosis, Signs/symptoms of bleeding, Laboratory test error suspected, Change in health, Change in alcohol use, Change in activity, Upcoming invasive procedure, Emergency department visit, Upcoming dental procedure, Missed doses, Extra doses, Change in medications, Hospital admission, Bruising, Other complaints   Comments:  Possible increase in GLV intake? Evy reports patient averages 1-2x salads per week. She is agreeable to have him skip 1x salad this week and then resume usual intake next week. Otherwise denies findings.     Plan:  1. INR was slightly SUBtherapeutic yesterday at 1.99 (goal 2.0-3.0). Instructed Geni to have Mr. Ortega continue warfarin 10mg oral daily until recheck.  2. Repeat INR in 4 weeks, 3/16  3. Verbal information provided over the phone. Eliz Ortega's spouse, Terri, RBV dosing instructions, expresses understanding by teach back, and has no further questions at this time.  4. Please note:   Mr. Ortega requests the clinic does not ask him interview questions at each encounter. He agrees to call the clinic with any changes or upcoming surgeries/procedures. He also requests we talk to either Geni (spouse) or Dianne (daughter) when we call. They fill up his medication planner for him. He says it is ok for us to confirm dosing with Dianne or Geni and then give them next testing date. If out of range, Mr. Ortega was advised we will have to ask him interview questions.    Fredrick Rosales, BOSSMAN  2/17/2022  09:28 EST     VERONICA, Carlos Valdivia, PharmD, have reviewed the note in full and agree with the assessment and plan.  02/17/22  11:19 EST

## 2022-03-10 ENCOUNTER — TELEPHONE (OUTPATIENT)
Dept: PHARMACY | Facility: HOSPITAL | Age: 62
End: 2022-03-10

## 2022-03-10 ENCOUNTER — OFFICE VISIT (OUTPATIENT)
Dept: CARDIOLOGY | Facility: CLINIC | Age: 62
End: 2022-03-10

## 2022-03-10 VITALS
BODY MASS INDEX: 33.02 KG/M2 | DIASTOLIC BLOOD PRESSURE: 78 MMHG | SYSTOLIC BLOOD PRESSURE: 124 MMHG | HEART RATE: 90 BPM | OXYGEN SATURATION: 97 % | WEIGHT: 210.8 LBS

## 2022-03-10 DIAGNOSIS — I48.21 PERMANENT ATRIAL FIBRILLATION: Primary | ICD-10-CM

## 2022-03-10 PROCEDURE — 99214 OFFICE O/P EST MOD 30 MIN: CPT | Performed by: INTERNAL MEDICINE

## 2022-03-10 NOTE — TELEPHONE ENCOUNTER
----- Message -----   From: Felipe Romero MD   Sent: 3/10/2022   2:30 PM EST   To: Carlos Valdivia, PharmD     Paras Gonzalez,     Just wanted to let you know that this patient is switching to Xarelto from warfarin so he is not going to be getting further INRs.  Thanks for your help in managing him.     Larry Romero            Recommend patient episode be resolved.

## 2022-03-10 NOTE — PROGRESS NOTES
[facility] Cardiology  Follow Up Visit  Eliz Ortega  1960    VISIT DATE:  03/10/22    PCP:   Anthony Mukherjee MD  [address] [place] [address]          CC:  Coronary Artery Disease      Problem List:  1. Coronary artery disease:  a. 01/08/2015: Lateral STEMI with emergent 3.0 x 18 Xience KENZIE to first diagonal, non-obstructive disease  otherwise. EF 45%.   b. Echocardiogram at [facility] 2015 showed ejection fraction of 50% with normal cardiac valves mild mitral regurgitation and mild tricuspid regurgitation  2. Atrial fibrillation, new onset, unclear duration:   a. Cardioversion x2, 01/08/2015 and 01/09/2015, with conversion to sinus   Initiation of sotalol  and warfarin.   b. 02/05/2015: Documentation of recurrent atrial fibrillation, unknown duration, asymptomatic.  Committed to rate control anticoagulation.   3. Dyslipidemia.    4.  hypertension.   5. Hypothyroid on replacement      Echo April 2021:  EF 45 to 50%, mild global hypokinesis, mild mitral regurgitation mild tricuspid regurgitation    History of Present Illness:  Eliz Ortega  Is a 61 y.o. male with pertinent cardiac history detailed above.  Expressing interest in transitioning to a NOAC instead of warfarin.  He has not had any issues with bruising or bleeding.  Renal function is normal.  CBC showed normal H/H.  No complaints of chest pain dyspnea.  Still working regularly.  Vitals within normal limits.      Patient Active Problem List    Diagnosis Date Noted   • Chronic atrial fibrillation (HCC) 07/24/2018   • Essential hypertension 09/07/2017   • Mixed hyperlipidemia 09/07/2017   • Coronary artery disease      Note Last Updated: 9/30/2016     1. Coronary artery disease:  a. 01/08/2015: Lateral STEMI with emergent 3.0 x 18 Xience KENZIE to first diagonal, non-obstructive disease otherwise. EF 45%.        • A-fib (HCC)      Note Last Updated: 8/4/2017     2. Atrial fibrillation, new onset,  unclear duration:   a. Cardioversion x2, 01/08/2015 and 01/09/2015, with conversion to sinus mechanism.  Initiation of sotalol and warfarin.   b. 02/05/2015: Documentation of recurrent atrial fibrillation, unknown duration, asymptomatic.  Committed to rate control anticoagulation.        • Dyslipidemia    • BP (high blood pressure)      Note Last Updated: 8/4/2017     Borderline hypertension.         Allergies   Allergen Reactions   • Penicillins Hives       Social History     Socioeconomic History   • Marital status: Single   Tobacco Use   • Smoking status: Never Smoker   • Smokeless tobacco: Former User     Types: Snuff     Quit date: 04/2019   Substance and Sexual Activity   • Alcohol use: No   • Drug use: No   • Sexual activity: Defer       Family History   Problem Relation Age of Onset   • Hypertension Mother    • Heart attack Mother    • No Known Problems Father        Current Medications:    Current Outpatient Medications:   •  aspirin 81 MG EC tablet, Take 81 mg by mouth Daily., Disp: , Rfl:   •  atorvastatin (LIPITOR) 20 MG tablet, Take 20 mg by mouth Every Night., Disp: , Rfl:   •  bisoprolol (ZEBeta) 5 MG tablet, Take 2.5 mg by mouth Daily., Disp: , Rfl:   •  famotidine (PEPCID) 20 MG tablet, Take 20 mg by mouth Daily., Disp: , Rfl:   •  levothyroxine (SYNTHROID, LEVOTHROID) 50 MCG tablet, Take 50 mcg by mouth Daily., Disp: , Rfl:   •  losartan (COZAAR) 12.5 MG half tablet, Take 12.5 mg by mouth Daily., Disp: , Rfl:   •  nitroglycerin (NITROSTAT) 0.4 MG SL tablet, Place 0.4 mg under the tongue Every 5 (Five) Minutes As Needed., Disp: , Rfl:      Review of Systems   Cardiovascular: Positive for leg swelling (Trace). Negative for chest pain, dyspnea on exertion and irregular heartbeat.   Hematologic/Lymphatic: Does not bruise/bleed easily.       Vitals:    03/10/22 1344   BP: 124/78   BP Location: Right arm   Patient Position: Sitting   Pulse: 90   SpO2: 97%   Weight: 95.6 kg (210 lb 12.8 oz)       Physical  Exam  Constitutional:       Appearance: Normal appearance.   Cardiovascular:      Rate and Rhythm: Rhythm irregular.      Pulses: Normal pulses.   Pulmonary:      Effort: Pulmonary effort is normal.      Breath sounds: Normal breath sounds.   Musculoskeletal:      Cervical back: Neck supple.      Right lower leg: Edema present.      Left lower leg: Edema present.      Comments: Trace   Neurological:      General: No focal deficit present.      Mental Status: He is alert.   Psychiatric:         Mood and Affect: Mood normal.         Diagnostic Data:  Procedures  Lab Results   Component Value Date    CHLPL 128 01/09/2015    TRIG 108 01/09/2015    HDL 37 (L) 01/09/2015     Lab Results   Component Value Date    GLUCOSE 94 01/09/2015    BUN 15 01/09/2015    CREATININE 0.7 01/09/2015     01/09/2015    K 3.9 01/09/2015     01/09/2015    CO2 23 01/09/2015     Lab Results   Component Value Date    HGBA1C 5.4 01/09/2015     Lab Results   Component Value Date    WBC 9.36 01/09/2015    HGB 13.9 01/09/2015    HCT 42.5 01/09/2015     01/09/2015       Assessment:  No diagnosis found.    Plan:    1.  CAD with prior lateral STEMI  -Continue aspirin, atorvastatin, bisoprolol  -Lipids at goal  -Currently asymptomatic, no angina   -EF stable around 45 to 50%  -LDL58     2.  Persistent atrial fibrillation.  -Patient is currently under a rate control and anticoagulation strategy   -Rate is controlled on bisoprolol  -wants to transition to NOAC will have him hold warfarin for 48 hours then start Xarelto in its place  -No current symptoms rates are controlled     3.  HTN  -Controlled on current medication includes bisoprolol and losartan     4.  Hypothyroid  -Followed by his PCP last TSH reviewed and was within normal limits     5.  Mild mitral and tricuspid regurgitation echo 2021  -EF 45 to 50% which is a stable finding.           Felipe Romero MD [facility]

## 2022-08-04 NOTE — PROGRESS NOTES
[place] [facility] at [facility]  Follow Up Visit  Eliz Ortega  1960    VISIT DATE:  02/20/20    PCP:   Anthony Mukherjee MD  [address] [place] [address]      CC: Follow-up for CAD and atrial fibrillation    Problem List:  1. Coronary artery disease:  a. 01/08/2015: Lateral STEMI with emergent 3.0 x 18 Xience KENZIE to first diagonal, non-obstructive disease  otherwise. EF 45%.   b. Echocardiogram at [facility] 2015 showed ejection fraction of 50% with normal cardiac valves mild mitral regurgitation and mild tricuspid regurgitation  2. Atrial fibrillation, new onset, unclear duration:   a. Cardioversion x2, 01/08/2015 and 01/09/2015, with conversion to sinus mechanism.  Initiation of sotalol  and warfarin.   b. 02/05/2015: Documentation of recurrent atrial fibrillation, unknown duration, asymptomatic.  Committed to rate control anticoagulation.   3. Dyslipidemia.  Lipids from June 2019 reviewed showed a total cholesterol 108, triglycerides of 108, HDL of 41, and LDL 45.  Chemistry was within normal limits  4. Borderline hypertension.   5. Hypothyroid on replacement last TSH elevated at 5.7           History of Present Illness:  Eliz Ortega  Is a 59 y.o. male with pertinent cardiac history detailed above.  At last visit he was doing well without complaints.  He continues to feel well today with no chest pain no dyspnea no palpitations, no edema or orthopnea.  Follows with our anticoagulation clinic for warfarin checks and levels have been therapeutic.  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  Recent labs reviewed showed good lipid control.  Pressure controlled today.  EKG shows permanent atrial fibrillation but no new abnormalities.  Endorses no other issues or concerns today      Patient Active Problem List    Diagnosis Date Noted   • Chronic atrial fibrillation 07/24/2018   • Essential hypertension 09/07/2017   • Mixed hyperlipidemia  Pa was denied :      Per your health plan's criteria, this drug is covered if you meet the following:  One of the following:  (A) You have cancer or seizure disorder.  (B) You have other terminal illness.  (C) You have a condition that affects behavior and motor function (catatonia).  (D) You have a disorder of the inner ear that can lead to dizzy spells (vertigo) and hearing  loss (intractable Meniere's disease).  (E) You have a disorder that makes it hard for you to control your muscles [spasticity  associated with a central neurological disorder (for example: cerebral palsy, dystonia,  paraplegia)] and the following:  (I) The requested drug has support for your condition (compendia indication for spasticity)  (diazepam, clonazepam, or clorazepate).  (II) One of the following:  (a) The drug is prescribed or recommended by a doctor who specializes in brain and nerve  disorders (neurologist of physical medicine and rehabilitation specialist).  (b) You have tried and failed at least two non-benzodiazepine muscle relaxants.  (G) You have a movement disorder causing a feeling of restlessness and an inability to stay  still (akathisia) and one of the following:  [id number]  (H) You have one of the following:  (I) You have tried and failed propranolol.  (II) Your doctor gives us a medical reason why you need to take benzodiazepine therapy  instead of propranolol.  (I) Your doctor gives us a medical reason why you need more than the plan limit for starting  benzodiazepine therapy [first claim does not exceed 15 days supply with future claim(s) not to  exceed 15 days supply (total 30 days supply) in 90 days].   09/07/2017   • Coronary artery disease      Note Last Updated: 9/30/2016     1. Coronary artery disease:  a. 01/08/2015: Lateral STEMI with emergent 3.0 x 18 Xience KENZIE to first diagonal, non-obstructive disease otherwise. EF 45%.        • A-fib (CMS/HCC)      Note Last Updated: 8/4/2017     2. Atrial fibrillation, new onset, unclear duration:   a. Cardioversion x2, 01/08/2015 and 01/09/2015, with conversion to sinus mechanism.  Initiation of sotalol and warfarin.   b. 02/05/2015: Documentation of recurrent atrial fibrillation, unknown duration, asymptomatic.  Committed to rate control anticoagulation.        • Dyslipidemia    • BP (high blood pressure)      Note Last Updated: 8/4/2017     Borderline hypertension.         Allergies   Allergen Reactions   • Penicillins Hives       Social History     Socioeconomic History   • Marital status: Single     Spouse name: Not on file   • Number of children: Not on file   • Years of education: Not on file   • Highest education level: Not on file   Occupational History     Employer: [employer]   Tobacco Use   • Smoking status: Never Smoker   • Smokeless tobacco: Former User     Types: Snuff   Substance and Sexual Activity   • Alcohol use: No   • Drug use: No   • Sexual activity: Defer       Family History   Problem Relation Age of Onset   • Hypertension Mother    • Heart attack Mother    • No Known Problems Father        Current Medications:    Current Outpatient Medications:   •  aspirin 81 MG EC tablet, Take 81 mg by mouth Daily., Disp: , Rfl:   •  atorvastatin (LIPITOR) 20 MG tablet, Take 20 mg by mouth Every Night., Disp: , Rfl:   •  bisoprolol (ZEBeta) 5 MG tablet, Take 2.5 mg by mouth Daily., Disp: , Rfl:   •  levothyroxine (SYNTHROID, LEVOTHROID) 50 MCG tablet, Take 50 mcg by mouth Daily., Disp: , Rfl:   •  losartan (COZAAR) 12.5 MG half tablet, Take 12.5 mg by mouth Daily., Disp: , Rfl:   •  nitroglycerin (NITROSTAT) 0.4 MG SL tablet, Place 0.4 mg under the tongue  "Every 5 (Five) Minutes As Needed., Disp: , Rfl:   •  warfarin (COUMADIN) 10 MG tablet, Take 1 tablet by mouth daily, or as directed by the anticoagulation clinic., Disp: 90 tablet, Rfl: 1  •  famotidine (PEPCID) 20 MG tablet, Take 20 mg by mouth Daily., Disp: , Rfl:      Review of Systems   Cardiovascular: Negative for chest pain, dyspnea on exertion, irregular heartbeat, leg swelling, palpitations and paroxysmal nocturnal dyspnea.   Respiratory: Negative for cough and shortness of breath.    All other systems reviewed and are negative.      Vitals:    02/20/20 0938   BP: 123/86   BP Location: Left arm   Patient Position: Sitting   Pulse: 93   SpO2: 97%   Weight: 95.5 kg (210 lb 9.6 oz)   Height: 170.2 cm (67\")       Physical Exam   Constitutional: He is oriented to person, place, and time. He appears well-developed and well-nourished.   Neck: Neck supple. No JVD present.   No bruits   Cardiovascular: Normal rate and intact distal pulses. An irregular rhythm present. Exam reveals no friction rub.   No murmur heard.  Pulmonary/Chest: Effort normal and breath sounds normal.   Abdominal: Soft.   Musculoskeletal: Normal range of motion. He exhibits no edema.   Neurological: He is alert and oriented to person, place, and time.   Skin: Skin is warm and dry.   Psychiatric: He has a normal mood and affect.       Diagnostic Data:    ECG 12 Lead  Date/Time: 2/20/2020 9:54 AM  Performed by: Felipe Romero MD  Authorized by: Felipe Romero MD   Comparison: compared with previous ECG from 7/10/2018  Rhythm: atrial fibrillation  Rate: normal  BPM: 91  QRS axis: normal    Clinical impression: abnormal EKG          Lab Results   Component Value Date    CHLPL 128 01/09/2015    TRIG 108 01/09/2015    HDL 37 (L) 01/09/2015     Lab Results   Component Value Date    GLUCOSE 94 01/09/2015    BUN 15 01/09/2015    CREATININE 0.7 01/09/2015     01/09/2015    K 3.9 01/09/2015     01/09/2015    CO2 23 01/09/2015 "     Lab Results   Component Value Date    HGBA1C 5.4 01/09/2015     Lab Results   Component Value Date    WBC 9.36 01/09/2015    HGB 13.9 01/09/2015    HCT 42.5 01/09/2015     01/09/2015       Assessment:   Diagnosis Plan   1. Permanent atrial fibrillation     2. Coronary artery disease involving native coronary artery of native heart without angina pectoris         Plan:      1.  CAD with prior lateral STEMI  -Continue aspirin, atorvastatin, bisoprolol  -Lipids at goal  -Currently asymptomatic, no angina    2.  Persistent atrial fibrillation.  -Patient is currently under a rate control and anticoagulation strategy   -Rate is controlled on bisoprolol  -He is on warfarin and follows with anticoagulation clinic for INR checks has been staying between 2 and 3  -No current symptoms    3.  HTN  -Controlled on current medication includes bisoprolol and losartan    4.  Hypothyroid  -Followed by his PCP last TSH reviewed and was within normal limits    Doing well currently stable.  Follow-up 1 year      Felipe Romero MD

## 2022-12-14 NOTE — PROGRESS NOTES
[facility]  Follow Up Visit  Eliz Ortega  1960    VISIT DATE:  12/15/22    PCP:   Anthony Mukherjee MD  [address] [place] [address]          CC:  perm AF      Problem List:  1. Coronary artery disease:  a. 01/08/2015: Lateral STEMI with emergent 3.0 x 18 Xience KENZIE to first diagonal, non-obstructive disease  otherwise. EF 45%.   b. Echocardiogram at [facility] 2015 showed ejection fraction of 50% with normal cardiac valves mild mitral regurgitation and mild tricuspid regurgitation  2. Atrial fibrillation, new onset, unclear duration:   a. Cardioversion x2, 01/08/2015 and 01/09/2015, with conversion to sinus   Initiation of sotalol  and warfarin.   b. 02/05/2015: Documentation of recurrent atrial fibrillation, unknown duration, asymptomatic.  Committed to rate control anticoagulation.   3. Dyslipidemia.    4.  hypertension.   5. Hypothyroid on replacement        Echo April 2021:  EF 45 to 50%, mild global hypokinesis, mild mitral regurgitation mild tricuspid regurgitation       History of Present Illness:  Eliz Ortega  Is a 62 y.o. male with pertinent cardiac history detailed above.  Patient following up for A. fib.  He is now on Eliquis.  No bleeding issues his rates are controlled and he has been feeling well.  He has no complaints of chest pain dyspnea or edema.  Recent lab work reviewed and all looks well.  He has no additional concerns.    Patient Active Problem List    Diagnosis Date Noted   • Chronic atrial fibrillation (HCC) 07/24/2018   • Essential hypertension 09/07/2017   • Mixed hyperlipidemia 09/07/2017   • Coronary artery disease      Note Last Updated: 9/30/2016     1. Coronary artery disease:  a. 01/08/2015: Lateral STEMI with emergent 3.0 x 18 Xience KENZIE to first diagonal, non-obstructive disease otherwise. EF 45%.        • A-fib (HCC)      Note Last Updated: 8/4/2017     2. Atrial fibrillation, new onset, unclear duration:    a. Cardioversion x2, 01/08/2015 and 01/09/2015, with conversion to sinus mechanism.  Initiation of sotalol and warfarin.   b. 02/05/2015: Documentation of recurrent atrial fibrillation, unknown duration, asymptomatic.  Committed to rate control anticoagulation.        • Dyslipidemia    • BP (high blood pressure)      Note Last Updated: 8/4/2017     Borderline hypertension.         Allergies   Allergen Reactions   • Penicillins Hives       Social History     Socioeconomic History   • Marital status: Single   Tobacco Use   • Smoking status: Never   • Smokeless tobacco: Former     Types: Snuff     Quit date: 04/2019   Substance and Sexual Activity   • Alcohol use: No   • Drug use: No   • Sexual activity: Defer       Family History   Problem Relation Age of Onset   • Hypertension Mother    • Heart attack Mother    • No Known Problems Father        Current Medications:    Current Outpatient Medications:   •  apixaban (ELIQUIS) 5 MG tablet tablet, Take 1 tablet by mouth 2 (Two) Times a Day. Discontinue Xarelto, Disp: 60 tablet, Rfl: 11  •  aspirin 81 MG EC tablet, Take 81 mg by mouth Daily., Disp: , Rfl:   •  atorvastatin (LIPITOR) 20 MG tablet, Take 20 mg by mouth Every Night., Disp: , Rfl:   •  bisoprolol (ZEBeta) 5 MG tablet, Take 2.5 mg by mouth Daily., Disp: , Rfl:   •  famotidine (PEPCID) 20 MG tablet, Take 20 mg by mouth 2 (Two) Times a Day., Disp: , Rfl:   •  levothyroxine (SYNTHROID, LEVOTHROID) 50 MCG tablet, Take 50 mcg by mouth Daily., Disp: , Rfl:   •  losartan (COZAAR) 12.5 MG half tablet, Take 12.5 mg by mouth Daily., Disp: , Rfl:   •  nitroglycerin (NITROSTAT) 0.4 MG SL tablet, Place 0.4 mg under the tongue Every 5 (Five) Minutes As Needed., Disp: , Rfl:      Review of Systems   Cardiovascular: Negative for chest pain, dyspnea on exertion, irregular heartbeat, near-syncope, palpitations and syncope.       Vitals:    12/15/22 1328   BP: 124/78   BP Location: Left arm   Patient Position: Sitting   Pulse:  "87   SpO2: 97%   Weight: 95.3 kg (210 lb)   Height: 170.2 cm (67\")       Physical Exam  Constitutional:       Appearance: Normal appearance.   Cardiovascular:      Rate and Rhythm: Normal rate. Rhythm irregular.      Pulses: Normal pulses.      Heart sounds: Normal heart sounds.   Pulmonary:      Effort: Pulmonary effort is normal.      Breath sounds: Normal breath sounds.   Abdominal:      General: Abdomen is flat.      Palpations: Abdomen is soft.   Musculoskeletal:      Right lower leg: No edema.      Left lower leg: No edema.   Neurological:      General: No focal deficit present.      Mental Status: He is alert.         Diagnostic Data:  Procedures  Lab Results   Component Value Date    CHLPL 128 01/09/2015    TRIG 108 01/09/2015    HDL 37 (L) 01/09/2015     Lab Results   Component Value Date    GLUCOSE 94 01/09/2015    BUN 15 01/09/2015    CREATININE 0.7 01/09/2015     01/09/2015    K 3.9 01/09/2015     01/09/2015    CO2 23 01/09/2015     Lab Results   Component Value Date    HGBA1C 5.4 01/09/2015     Lab Results   Component Value Date    WBC 9.36 01/09/2015    HGB 13.9 01/09/2015    HCT 42.5 01/09/2015     01/09/2015       Assessment:  No diagnosis found.    Plan:    1.  CAD with prior lateral STEMI  -Continue aspirin, atorvastatin, bisoprolol  -Lipids at goal, LDL 55  -Currently asymptomatic, no angina   -EF stable around 45 to 50%       2.  Persistent atrial fibrillation.  -Long-term rate control strategy  -Rate is controlled on bisoprolol  -Coagulated with Eliquis  -No current symptoms rates are controlled     3.  HTN  -Controlled, no changes today     4.  Hypothyroid  -Followed by his PCP last TSH reviewed and was within normal limits November 2022     5.  Mild mitral and tricuspid regurgitation echo 2021  -EF 45 to 50% which is a stable finding.    Follow-up in 1 year or sooner as needed    Felipe Romero MD [facility]     "

## 2022-12-15 ENCOUNTER — OFFICE VISIT (OUTPATIENT)
Dept: CARDIOLOGY | Facility: CLINIC | Age: 62
End: 2022-12-15

## 2022-12-15 VITALS
HEIGHT: 67 IN | BODY MASS INDEX: 32.96 KG/M2 | SYSTOLIC BLOOD PRESSURE: 124 MMHG | OXYGEN SATURATION: 97 % | DIASTOLIC BLOOD PRESSURE: 78 MMHG | HEART RATE: 87 BPM | WEIGHT: 210 LBS

## 2022-12-15 DIAGNOSIS — I48.20 CHRONIC ATRIAL FIBRILLATION: Primary | ICD-10-CM

## 2022-12-15 PROCEDURE — 99214 OFFICE O/P EST MOD 30 MIN: CPT | Performed by: INTERNAL MEDICINE

## 2023-12-20 NOTE — PROGRESS NOTES
[facility]  Follow Up Visit  Eliz Ortega  1960    VISIT DATE:  12/21/23    PCP:   Anthony Mukherjee MD  [address] [place] [address]          CC:  Chronic atrial fibrillation      Problem List:  Coronary artery disease:  01/08/2015: Lateral STEMI with emergent 3.0 x 18 Xience KENZIE to first diagonal, non-obstructive disease  otherwise. EF 45%.   Echocardiogram at [facility] 2015 showed ejection fraction of 50% with normal cardiac valves mild mitral regurgitation and mild tricuspid regurgitation  Atrial fibrillation, new onset, unclear duration:   Cardioversion x2, 01/08/2015 and 01/09/2015, with conversion to sinus   Initiation of sotalol  and warfarin.   02/05/2015: Documentation of recurrent atrial fibrillation, unknown duration, asymptomatic.  Committed to rate control anticoagulation.   Dyslipidemia.     hypertension.   Hypothyroid on replacement        Echo April 2021:  EF 45 to 50%, mild global hypokinesis, mild mitral regurgitation mild tricuspid regurgitation    History of Present Illness:  Eliz Ortega  Is a 63 y.o. male with pertinent cardiac history detailed above.  Patient following up for CAD and A-fib.  Afib is rate controlled.  No new cardiac symptoms.   Occasional left leg cramps.  Possibly MSK in nature.  No  CP, dyspnea, or edema.  Tolerating eliquis well.        Patient Active Problem List    Diagnosis Date Noted    Chronic atrial fibrillation 07/24/2018    Essential hypertension 09/07/2017    Mixed hyperlipidemia 09/07/2017    Coronary artery disease      Note Last Updated: 9/30/2016     Coronary artery disease:  01/08/2015: Lateral STEMI with emergent 3.0 x 18 Xience KENZIE to first diagonal, non-obstructive disease otherwise. EF 45%.         A-fib      Note Last Updated: 8/4/2017     Atrial fibrillation, new onset, unclear duration:   Cardioversion x2, 01/08/2015 and 01/09/2015, with conversion to sinus mechanism.  Initiation of  "sotalol and warfarin.   02/05/2015: Documentation of recurrent atrial fibrillation, unknown duration, asymptomatic.  Committed to rate control anticoagulation.         Dyslipidemia     BP (high blood pressure)      Note Last Updated: 8/4/2017     Borderline hypertension.         Allergies   Allergen Reactions    Penicillins Hives       Social History     Socioeconomic History    Marital status: Single   Tobacco Use    Smoking status: Never     Passive exposure: Past    Smokeless tobacco: Former     Types: Snuff     Quit date: 04/2019   Substance and Sexual Activity    Alcohol use: No    Drug use: No    Sexual activity: Defer       Family History   Problem Relation Age of Onset    Hypertension Mother     Heart attack Mother     No Known Problems Father        Current Medications:    Current Outpatient Medications:     apixaban (ELIQUIS) 5 MG tablet tablet, Take 1 tablet by mouth 2 (Two) Times a Day., Disp: 180 tablet, Rfl: 3    aspirin 81 MG EC tablet, Take 1 tablet by mouth Daily., Disp: , Rfl:     atorvastatin (LIPITOR) 20 MG tablet, Take 1 tablet by mouth Every Night., Disp: , Rfl:     bisoprolol (ZEBeta) 5 MG tablet, Take 0.5 tablets by mouth Daily., Disp: , Rfl:     famotidine (PEPCID) 20 MG tablet, Take 1 tablet by mouth 2 (Two) Times a Day., Disp: , Rfl:     levothyroxine (SYNTHROID, LEVOTHROID) 50 MCG tablet, Take 1 tablet by mouth Daily., Disp: , Rfl:     losartan (COZAAR) 12.5 MG half tablet, Take 1 half tablet by mouth Daily., Disp: , Rfl:     nitroglycerin (NITROSTAT) 0.4 MG SL tablet, Place 1 tablet under the tongue Every 5 (Five) Minutes As Needed., Disp: , Rfl:      Review of Systems   Cardiovascular:  Negative for chest pain, dyspnea on exertion, irregular heartbeat and leg swelling.   Musculoskeletal:  Positive for muscle cramps.       Vitals:    12/21/23 1318   BP: 115/79   BP Location: Left arm   Patient Position: Sitting   Pulse: 90   SpO2: 97%   Weight: 98 kg (216 lb)   Height: 175.3 cm (69\") "       Physical Exam  Constitutional:       Appearance: Normal appearance.   Cardiovascular:      Rate and Rhythm: Normal rate. Rhythm irregular.      Heart sounds: No murmur heard.  Pulmonary:      Effort: Pulmonary effort is normal.      Breath sounds: Normal breath sounds.   Musculoskeletal:      Right lower leg: No edema.      Left lower leg: No edema.   Neurological:      Mental Status: He is alert.         Diagnostic Data:  Procedures  Lab Results   Component Value Date    CHLPL 128 01/09/2015    TRIG 108 01/09/2015    HDL 37 (L) 01/09/2015     Lab Results   Component Value Date    GLUCOSE 94 01/09/2015    BUN 15 01/09/2015    CREATININE 0.7 01/09/2015     01/09/2015    K 3.9 01/09/2015     01/09/2015    CO2 23 01/09/2015     Lab Results   Component Value Date    HGBA1C 5.4 01/09/2015     Lab Results   Component Value Date    WBC 9.36 01/09/2015    HGB 13.9 01/09/2015    HCT 42.5 01/09/2015     01/09/2015       Assessment:  No diagnosis found.    Plan:    1.  CAD with prior lateral STEMI  -Continue aspirin, atorvastatin, bisoprolol  -Lipids at goal, LDL63,   -Currently asymptomatic, no angina   -EF stable around 45 to 50%        2.  Persistent atrial fibrillation.  -Long-term rate control strategy  -Rate is controlled on bisoprolol  -AC with  Eliquis  -No current symptoms rates are controlled     3.  HTN  -WNL today  -normal renal function     4.  Hypothyroid  -last labs WNL 12/2023     5.  Mild mitral and tricuspid regurgitation echo 2021  -EF 45 to 50% which is a stable finding.  -no CHF symptoms    6.  Left leg cramping  -intermittent in nature  -screen for PAD with JESENIA    ACP discussion was held with the patient during this visit. Patient does not have an advance directive, declines further assistance.      Felipe Romero MD [facility]

## 2023-12-21 ENCOUNTER — OFFICE VISIT (OUTPATIENT)
Dept: CARDIOLOGY | Facility: CLINIC | Age: 63
End: 2023-12-21
Payer: COMMERCIAL

## 2023-12-21 VITALS
SYSTOLIC BLOOD PRESSURE: 115 MMHG | HEIGHT: 69 IN | BODY MASS INDEX: 31.99 KG/M2 | DIASTOLIC BLOOD PRESSURE: 79 MMHG | WEIGHT: 216 LBS | OXYGEN SATURATION: 97 % | HEART RATE: 90 BPM

## 2023-12-21 DIAGNOSIS — I73.9 PAD (PERIPHERAL ARTERY DISEASE): Primary | ICD-10-CM

## 2023-12-21 PROCEDURE — 99214 OFFICE O/P EST MOD 30 MIN: CPT | Performed by: INTERNAL MEDICINE

## 2024-01-25 ENCOUNTER — OFFICE VISIT (OUTPATIENT)
Dept: UROLOGY | Facility: CLINIC | Age: 64
End: 2024-01-25
Payer: COMMERCIAL

## 2024-01-25 VITALS
HEIGHT: 65 IN | SYSTOLIC BLOOD PRESSURE: 122 MMHG | TEMPERATURE: 98.2 F | OXYGEN SATURATION: 93 % | HEART RATE: 103 BPM | DIASTOLIC BLOOD PRESSURE: 64 MMHG | RESPIRATION RATE: 15 BRPM | BODY MASS INDEX: 35.56 KG/M2 | WEIGHT: 213.4 LBS

## 2024-01-25 DIAGNOSIS — R39.9 LOWER URINARY TRACT SYMPTOMS (LUTS): ICD-10-CM

## 2024-01-25 DIAGNOSIS — R97.20 ELEVATED PROSTATE SPECIFIC ANTIGEN (PSA): Primary | ICD-10-CM

## 2024-01-25 LAB
BILIRUB BLD-MCNC: NEGATIVE MG/DL
CLARITY, POC: CLEAR
COLOR UR: ABNORMAL
EXPIRATION DATE: ABNORMAL
GLUCOSE UR STRIP-MCNC: NEGATIVE MG/DL
KETONES UR QL: NEGATIVE
LEUKOCYTE EST, POC: NEGATIVE
Lab: ABNORMAL
NITRITE UR-MCNC: NEGATIVE MG/ML
PH UR: 6 [PH] (ref 5–8)
PROT UR STRIP-MCNC: ABNORMAL MG/DL
RBC # UR STRIP: NEGATIVE /UL
SP GR UR: 1.01 (ref 1–1.03)
UROBILINOGEN UR QL: NORMAL

## 2024-01-25 NOTE — PROGRESS NOTES
Chief Complaint  Elevated PSA    Referring Provider  Anthony Mukherjee MD    HPI  Mr. Ortega is a 63 y.o. male who presents with an elevated PSA to 5.6.       Patient complains of nocturia x 2  His IPSS score is 15.     Patient denies fevers, chills, nausea, vomiting, constipation, or flank pain.  Past  history is negative for BPH, frequent UTIs, gross hematuria, stones or other renal diseases.     He denies shortness of breath, leg swelling, calf pain or bone pain.    Past Medical History  Past Medical History:   Diagnosis Date    Atrial fibrillation     Coronary artery disease     Dyslipidemia     Heartburn     Hypertension     Borderline hypertension.    Lateral myocardial infarction     01/08/2015: Lateral STEMI with emergent 3.0 x 18 Xience KENZIE to first diagonal, non-obstructive disease otherwise. EF 45%.        Past Surgical History  Past Surgical History:   Procedure Laterality Date    CARDIAC CATHETERIZATION      CORONARY ANGIOPLASTY WITH STENT PLACEMENT  01/08/2015 01/08/2015: Lateral STEMI with emergent 3.0 x 18 Xience KENZIE to first diagonal, non-obstructive disease otherwise. EF 45%.     FINGER SURGERY         Medications    Current Outpatient Medications:     apixaban (ELIQUIS) 5 MG tablet tablet, Take 1 tablet by mouth 2 (Two) Times a Day., Disp: 180 tablet, Rfl: 3    aspirin 81 MG EC tablet, Take 1 tablet by mouth Daily., Disp: , Rfl:     atorvastatin (LIPITOR) 20 MG tablet, Take 1 tablet by mouth Every Night., Disp: , Rfl:     bisoprolol (ZEBeta) 5 MG tablet, Take 0.5 tablets by mouth Daily., Disp: , Rfl:     famotidine (PEPCID) 20 MG tablet, Take 1 tablet by mouth 2 (Two) Times a Day., Disp: , Rfl:     levothyroxine (SYNTHROID, LEVOTHROID) 50 MCG tablet, Take 1 tablet by mouth Daily., Disp: , Rfl:     losartan (COZAAR) 12.5 MG half tablet, Take 1 half tablet by mouth Daily., Disp: , Rfl:     nitroglycerin (NITROSTAT) 0.4 MG SL tablet, Place 1 tablet under the tongue Every 5 (Five) Minutes As  "Needed. (Patient not taking: Reported on 1/25/2024), Disp: , Rfl:     Allergies  Allergies   Allergen Reactions    Penicillins Hives       Social History  Social History     Tobacco Use    Smoking status: Never     Passive exposure: Past    Smokeless tobacco: Former     Types: Snuff     Quit date: 04/2019   Substance Use Topics    Alcohol use: No    Drug use: No       Family History  Family History   Problem Relation Age of Onset    Hypertension Mother     Heart attack Mother     No Known Problems Father       He has + history of prostate cancer in brother at age 68. He had prostatectomy but is concerned that it has returned.       Physical Exam  Visit Vitals  /64 (BP Location: Left arm, Patient Position: Sitting, Cuff Size: Adult) Comment: afib--beat starts and stops between s/d   Pulse 103   Temp 98.2 °F (36.8 °C) (Temporal)   Resp 15   Ht 164 cm (64.57\")   Wt 96.8 kg (213 lb 6.4 oz)   SpO2 93%   BMI 35.99 kg/m²     A physical exam was notable for obesity.    Genitourinary  Rectal:  Normal tone, no masses.  Prostate:  35 grams.  Symmetric, non-tender, anodular and no induration.      Labs  Brief Urine Lab Results  (Last result in the past 365 days)        Color   Clarity   Blood   Leuk Est   Nitrite   Protein   CREAT   Urine HCG        01/25/24 1514 Straw   Clear   Negative   Negative   Negative   1+                   Lab Results   Component Value Date    PSA 5.220 (H) 12/06/2017    PSA 5.9 (H) 08/04/2017   Colonoscopy      Assessment  Mr. Ortega is a 63 y.o.  male who presents with elevated PSA.  This is a new diagnosis of uncertain clinical prognosis.  Biopsy was recommended back in 2017 by Dr. Xiong. Moderate LUTS but not bothered enough to want further workup. PSA has been very stable.     Plan  1. MRI prostate and follow up after    "

## 2024-02-02 LAB
BACTERIA SPEC CULT: ABNORMAL
MICROORGANISM/AGENT SPEC: ABNORMAL
OTHER ANTIBIOTIC SUSC ISLT: ABNORMAL

## 2024-02-25 ENCOUNTER — HOSPITAL ENCOUNTER (OUTPATIENT)
Dept: MRI IMAGING | Facility: HOSPITAL | Age: 64
Discharge: HOME | End: 2024-02-25
Admitting: UROLOGY
Payer: COMMERCIAL

## 2024-02-25 DIAGNOSIS — R97.20 ELEVATED PROSTATE SPECIFIC ANTIGEN (PSA): ICD-10-CM

## 2024-02-25 PROCEDURE — 72197 MRI PELVIS W/O & W/DYE: CPT

## 2024-02-25 PROCEDURE — A9577 INJ MULTIHANCE: HCPCS | Performed by: UROLOGY

## 2024-02-25 PROCEDURE — 0 GADOBENATE DIMEGLUMINE 529 MG/ML SOLUTION: Performed by: UROLOGY

## 2024-02-25 PROCEDURE — 82565 ASSAY OF CREATININE: CPT

## 2024-02-25 RX ADMIN — GADOBENATE DIMEGLUMINE 20 ML: 529 INJECTION, SOLUTION INTRAVENOUS at 19:01

## 2024-03-02 LAB — CREAT BLDA-MCNC: 1.1 MG/DL (ref 0.6–1.3)

## 2024-03-11 ENCOUNTER — OFFICE VISIT (OUTPATIENT)
Dept: UROLOGY | Facility: CLINIC | Age: 64
End: 2024-03-11
Payer: COMMERCIAL

## 2024-03-11 VITALS
DIASTOLIC BLOOD PRESSURE: 82 MMHG | WEIGHT: 218.8 LBS | BODY MASS INDEX: 34.34 KG/M2 | SYSTOLIC BLOOD PRESSURE: 124 MMHG | HEIGHT: 67 IN

## 2024-03-11 DIAGNOSIS — R97.20 ELEVATED PROSTATE SPECIFIC ANTIGEN (PSA): Primary | ICD-10-CM

## 2024-03-11 DIAGNOSIS — R39.9 LOWER URINARY TRACT SYMPTOMS (LUTS): ICD-10-CM

## 2024-03-11 RX ORDER — LOSARTAN POTASSIUM 25 MG/1
12.5 TABLET ORAL DAILY
COMMUNITY
Start: 2024-02-23

## 2024-03-11 NOTE — PROGRESS NOTES
Chief Complaint  Elevated PSA    HPI  Mr. Ortega is a 63 y.o. male who presents with an elevated PSA to 5.6.       Patient complains of nocturia x 2  His IPSS score is 15.     Past Medical History  Past Medical History:   Diagnosis Date    Atrial fibrillation     Coronary artery disease     Dyslipidemia     Heartburn     Hypertension     Borderline hypertension.    Lateral myocardial infarction     01/08/2015: Lateral STEMI with emergent 3.0 x 18 Xience KENZIE to first diagonal, non-obstructive disease otherwise. EF 45%.        Past Surgical History  Past Surgical History:   Procedure Laterality Date    CARDIAC CATHETERIZATION      CORONARY ANGIOPLASTY WITH STENT PLACEMENT  01/08/2015 01/08/2015: Lateral STEMI with emergent 3.0 x 18 Xience KENZIE to first diagonal, non-obstructive disease otherwise. EF 45%.     FINGER SURGERY         Medications    Current Outpatient Medications:     apixaban (ELIQUIS) 5 MG tablet tablet, Take 1 tablet by mouth 2 (Two) Times a Day., Disp: 180 tablet, Rfl: 3    aspirin 81 MG EC tablet, Take 1 tablet by mouth Daily., Disp: , Rfl:     atorvastatin (LIPITOR) 20 MG tablet, Take 1 tablet by mouth Every Night., Disp: , Rfl:     bisoprolol (ZEBeta) 5 MG tablet, Take 0.5 tablets by mouth Daily., Disp: , Rfl:     famotidine (PEPCID) 20 MG tablet, Take 1 tablet by mouth 2 (Two) Times a Day., Disp: , Rfl:     levothyroxine (SYNTHROID, LEVOTHROID) 50 MCG tablet, Take 1 tablet by mouth Daily., Disp: , Rfl:     losartan (COZAAR) 12.5 MG half tablet, Take 1 half tablet by mouth Daily., Disp: , Rfl:     losartan (COZAAR) 25 MG tablet, Take 0.5 tablets by mouth Daily. (Patient not taking: Reported on 3/11/2024), Disp: , Rfl:     nitroglycerin (NITROSTAT) 0.4 MG SL tablet, Place 1 tablet under the tongue Every 5 (Five) Minutes As Needed. (Patient not taking: Reported on 1/25/2024), Disp: , Rfl:     Allergies  Allergies   Allergen Reactions    Penicillins Hives       Social History  Social History  "    Tobacco Use    Smoking status: Never     Passive exposure: Past    Smokeless tobacco: Former     Types: Snuff     Quit date: 04/2019   Substance Use Topics    Alcohol use: No    Drug use: No       Family History  Family History   Problem Relation Age of Onset    Hypertension Mother     Heart attack Mother     No Known Problems Father       He has + history of prostate cancer in brother at age 68. He had prostatectomy but is concerned that it has returned.       Physical Exam  Visit Vitals  /82   Ht 170.2 cm (67.01\")   Wt 99.2 kg (218 lb 12.8 oz)   BMI 34.26 kg/m²     A physical exam was notable for obesity.      Labs  Brief Urine Lab Results  (Last result in the past 365 days)        Color   Clarity   Blood   Leuk Est   Nitrite   Protein   CREAT   Urine HCG        01/25/24 1514 Straw   Clear   Negative   Negative   Negative   1+                   Lab Results   Component Value Date    PSA 5.220 (H) 12/06/2017    PSA 5.9 (H) 08/04/2017     MRI Prostate With & Without Contrast  Result Date: 2/26/2024  Impression: No findings to suggest clinically significant prostate cancer (PI-RADS 2 exam). BPH with gland volume of 43 cc. Peripheral zone findings suggestive of chronic prostatitis. Electronically Signed: Jose Kwong MD  2/26/2024 2:14 PM EST  Workstation ID: [id number]         Assessment  Mr. Ortega is a 63 y.o.  male who presents with elevated PSA.  This is a new diagnosis of uncertain clinical prognosis.  Biopsy was recommended back in 2017 by Dr. Xiong. Moderate LUTS but not bothered enough to want further workup. PSA has been very stable.  MRI was negative for any PI-RADS 4 or 5 lesions.    Plan  1. FU in 1 yr w total and free PSA w/ APRN    "

## 2024-12-06 ENCOUNTER — TELEPHONE (OUTPATIENT)
Dept: UROLOGY | Facility: CLINIC | Age: 64
End: 2024-12-06
Payer: COMMERCIAL

## 2024-12-06 NOTE — TELEPHONE ENCOUNTER
[facility] received a fax that requires your attention. The document has been indexed to the patient’s chart for your review.      Reason for sending:  RCVD AND INDEXED LAB RESULTS.     Documents Description:  LAB RESULTS_ [facility]_ 11-21-24    Name of Sender:  [facility]    Date Indexed: 12/06/24    Notes (if needed):

## 2024-12-11 ENCOUNTER — TELEPHONE (OUTPATIENT)
Dept: UROLOGY | Facility: CLINIC | Age: 64
End: 2024-12-11
Payer: COMMERCIAL

## 2024-12-11 NOTE — TELEPHONE ENCOUNTER
Caller: Eliz Ortega    Relationship: Self    Best call back number:[phone]    What is the best time to reach you: ANY    Who are you requesting to speak with (clinical staff, provider,  specific staff member): CLINICAL    Do you know the name of the person who called: SHARAD    What was the call regarding: PT RETURNED CALL TO SCHEDULE FU APPT WITH DR MCCOY DUE TO ELEVATED PSA.    PT IS OFF WORK 12/23-12/30    UNABLE TO WARM COTA TO CLINICAL. PLEASE CALL PT TO SCHEDULE

## 2024-12-17 NOTE — TELEPHONE ENCOUNTER
I called and left  for patient to see if I can get him scheduled on December 23rd with Dr Sanchez here in the office.    Gorge CMA

## 2025-05-01 ENCOUNTER — OFFICE VISIT (OUTPATIENT)
Dept: CARDIOLOGY | Facility: CLINIC | Age: 65
End: 2025-05-01
Payer: COMMERCIAL

## 2025-05-01 VITALS
SYSTOLIC BLOOD PRESSURE: 140 MMHG | DIASTOLIC BLOOD PRESSURE: 88 MMHG | HEART RATE: 89 BPM | BODY MASS INDEX: 34.2 KG/M2 | HEIGHT: 66 IN | WEIGHT: 212.8 LBS | OXYGEN SATURATION: 98 %

## 2025-05-01 DIAGNOSIS — R06.09 DYSPNEA ON EXERTION: ICD-10-CM

## 2025-05-01 DIAGNOSIS — I48.21 PERMANENT ATRIAL FIBRILLATION: ICD-10-CM

## 2025-05-01 DIAGNOSIS — I10 ESSENTIAL HYPERTENSION: ICD-10-CM

## 2025-05-01 DIAGNOSIS — E78.2 MIXED HYPERLIPIDEMIA: ICD-10-CM

## 2025-05-01 DIAGNOSIS — I73.9 PVD (PERIPHERAL VASCULAR DISEASE) WITH CLAUDICATION: Primary | ICD-10-CM

## 2025-05-01 PROCEDURE — 99214 OFFICE O/P EST MOD 30 MIN: CPT | Performed by: INTERNAL MEDICINE

## 2025-05-01 NOTE — PROGRESS NOTES
[facility] Cardiology  Follow Up Visit  Eliz Ortega  1960    VISIT DATE:  05/01/25    PCP:   [facility] [place] [address]          CC:  SET PAD (Peripheral artery disease /)      Problem List:  Coronary artery disease:  01/08/2015: Lateral STEMI with emergent 3.0 x 18 Xience KENZIE to first diagonal, non-obstructive disease  otherwise. EF 45%.   Echocardiogram at [facility] 2015 showed ejection fraction of 50% with normal cardiac valves mild mitral regurgitation and mild tricuspid regurgitation  Atrial fibrillation, new onset, unclear duration:   Cardioversion x2, 01/08/2015 and 01/09/2015, with conversion to sinus   Initiation of sotalol  and warfarin.   02/05/2015: Documentation of recurrent atrial fibrillation, unknown duration, asymptomatic.  Committed to rate control anticoagulation.   Dyslipidemia.     hypertension.   Hypothyroid on replacement        Echo April 2021:  EF 45 to 50%, mild global hypokinesis, mild mitral regurgitation mild tricuspid regurgitation    History of Present Illness:  Eliz Ortega  Is a 64 y.o. male with pertinent cardiac history detailed above.  Patient does get some occasional dyspnea on exertion.  Denies any chest pain.  Last year JESENIA was planned but does not appear this was ever scheduled.  He gets occasional cramping in the legs.  No rest pain.  He remains in chronic A-fib.  On Eliquis for anticoagulation.  BP is mildly elevated today.  Typically does well on other visits.  His lipids are at goal.  No other concerns today.      Patient Active Problem List    Diagnosis Date Noted    Chronic atrial fibrillation 07/24/2018    Essential hypertension 09/07/2017    Mixed hyperlipidemia 09/07/2017    Coronary artery disease      Note Last Updated: 9/30/2016     Coronary artery disease:  01/08/2015: Lateral STEMI with emergent 3.0 x 18 Xience KENZIE to first diagonal, non-obstructive disease otherwise. EF 45%.          A-fib      Note Last Updated: 8/4/2017     Atrial fibrillation, new onset, unclear duration:   Cardioversion x2, 01/08/2015 and 01/09/2015, with conversion to sinus mechanism.  Initiation of sotalol and warfarin.   02/05/2015: Documentation of recurrent atrial fibrillation, unknown duration, asymptomatic.  Committed to rate control anticoagulation.         Dyslipidemia     BP (high blood pressure)      Note Last Updated: 8/4/2017     Borderline hypertension.         Allergies   Allergen Reactions    Penicillins Hives       Social History     Socioeconomic History    Marital status: Single   Tobacco Use    Smoking status: Never     Passive exposure: Past    Smokeless tobacco: Former     Types: Snuff     Quit date: 04/2019   Vaping Use    Vaping status: Never Used   Substance and Sexual Activity    Alcohol use: No    Drug use: No    Sexual activity: Defer       Family History   Problem Relation Age of Onset    Hypertension Mother     Heart attack Mother     No Known Problems Father        Current Medications:    Current Outpatient Medications:     apixaban (ELIQUIS) 5 MG tablet tablet, Take 1 tablet by mouth 2 (Two) Times a Day., Disp: 180 tablet, Rfl: 3    aspirin 81 MG EC tablet, Take 1 tablet by mouth Daily., Disp: , Rfl:     atorvastatin (LIPITOR) 20 MG tablet, Take 1 tablet by mouth Every Night., Disp: , Rfl:     bisoprolol (ZEBeta) 5 MG tablet, Take 0.5 tablets by mouth Daily., Disp: , Rfl:     famotidine (PEPCID) 20 MG tablet, Take 1 tablet by mouth As Needed., Disp: , Rfl:     levothyroxine (SYNTHROID, LEVOTHROID) 50 MCG tablet, Take 1 tablet by mouth Daily., Disp: , Rfl:     losartan (COZAAR) 12.5 MG half tablet, Take 1 half tablet by mouth Daily., Disp: , Rfl:     nitroglycerin (NITROSTAT) 0.4 MG SL tablet, Place 1 tablet under the tongue Every 5 (Five) Minutes As Needed., Disp: , Rfl:      Review of Systems   Cardiovascular:  Positive for claudication and palpitations. Negative for chest pain and  "leg swelling.   Respiratory:  Positive for shortness of breath.        Vitals:    05/01/25 1424   BP: 140/88   BP Location: Left arm   Patient Position: Sitting   Pulse: 89   SpO2: 98%   Weight: 96.5 kg (212 lb 12.8 oz)   Height: 167.6 cm (66\")       Physical Exam  Constitutional:       Appearance: Normal appearance.   Cardiovascular:      Rate and Rhythm: Normal rate. Rhythm irregular.   Pulmonary:      Breath sounds: Normal breath sounds.   Musculoskeletal:      Right lower leg: No edema.      Left lower leg: No edema.   Neurological:      Mental Status: He is alert.         Diagnostic Data:  Procedures  Lab Results   Component Value Date    CHLPL 128 01/09/2015    TRIG 108 01/09/2015    HDL 37 (L) 01/09/2015     Lab Results   Component Value Date    GLUCOSE 94 01/09/2015    BUN 15 01/09/2015    CREATININE 1.10 02/25/2024     01/09/2015    K 3.9 01/09/2015     01/09/2015    CO2 23 01/09/2015     Lab Results   Component Value Date    HGBA1C 5.4 01/09/2015     Lab Results   Component Value Date    WBC 9.36 01/09/2015    HGB 13.9 01/09/2015    HCT 42.5 01/09/2015     01/09/2015       Assessment:   Diagnosis Plan   1. PVD (peripheral vascular disease) with claudication  Doppler Arterial Multi Level Lower Extremity - Bilateral CAR      2. Dyspnea on exertion  Adult Transthoracic Echo Complete W/ Cont if Necessary Per Protocol    Stress Test With Myocardial Perfusion One Day      3. Permanent atrial fibrillation        4. Essential hypertension        5. Mixed hyperlipidemia            Plan:    1.  CAD with prior lateral STEMI  -Continue aspirin, atorvastatin, bisoprolol  -Lipids at goal, triglycerides 155, total cholesterol 119, HDL 41, LDL 52.  Hemoglobin A1c 6.5  - Has some dyspnea on exertion.  Will reevaluate echo given long-term A-fib, as well as stress MPS.  Has not had any recent ischemic evaluation      2.  Persistent atrial fibrillation.  -Long-term rate control strategy  -Rate is controlled " on bisoprolol  -AC with  Eliquis  - Remains rate controlled     3.  HTN  - Mildly elevated in the office today.  Typically well-controlled.  Continue to monitor.    4.  Hypothyroid  -last labs WNL 12/2023     5.  Mild mitral and tricuspid regurgitation echo 2021  -EF 45 to 50% which is a stable finding.  - Repeating echo     6.  Left leg cramping  -intermittent in nature  -screen for PAD with JESENIA.  This has been reordered.         ACP discussion was held with the patient during this visit. Patient does not have an advance directive, declines further assistance.      Felipe Romero MD [facility]

## 2025-05-19 ENCOUNTER — TELEPHONE (OUTPATIENT)
Dept: CARDIOLOGY | Facility: CLINIC | Age: 65
End: 2025-05-19
Payer: COMMERCIAL

## 2025-05-19 NOTE — TELEPHONE ENCOUNTER
----- Message from Felipe Romero sent at 5/19/2025  2:42 PM EDT -----  Can we let him know that the ultrasound of his leg arteries shows no significant blockage on either side.  All good news.  ----- Message -----  From: Oralia Heath  Sent: 5/19/2025   2:35 PM EDT  To: Felipe Romero MD

## 2025-05-23 ENCOUNTER — OUTSIDE FACILITY SERVICE (OUTPATIENT)
Dept: CARDIOLOGY | Facility: CLINIC | Age: 65
End: 2025-05-23
Payer: COMMERCIAL

## 2025-05-27 ENCOUNTER — TELEPHONE (OUTPATIENT)
Dept: CARDIOLOGY | Facility: CLINIC | Age: 65
End: 2025-05-27
Payer: COMMERCIAL

## 2025-05-27 NOTE — TELEPHONE ENCOUNTER
----- Message from Felipe Romero sent at 5/27/2025 12:46 PM EDT -----  Can we let him know the echo from [place] showed normal pumping function of the heart.  All heart valves working well.  Will update him again once we have his stress test results  ----- Message -----  From: Oralia Heath  Sent: 5/27/2025   9:43 AM EDT  To: Felipe Romero MD

## 2025-05-27 NOTE — TELEPHONE ENCOUNTER
Attempted to reach pt on home number. No answer. Left voicemail for pt to return call.    Called 2nd number. That is for Evy. She is on HIPAA. I informed her of result. She verbalized understanding and said she would inform pt.

## 2025-05-30 ENCOUNTER — RESULTS FOLLOW-UP (OUTPATIENT)
Dept: CARDIOLOGY | Facility: CLINIC | Age: 65
End: 2025-05-30
Payer: COMMERCIAL

## 2025-05-30 ENCOUNTER — HOSPITAL ENCOUNTER (OUTPATIENT)
Dept: CARDIOLOGY | Facility: HOSPITAL | Age: 65
Discharge: HOME | End: 2025-05-30
Admitting: INTERNAL MEDICINE
Payer: COMMERCIAL

## 2025-05-30 VITALS — WEIGHT: 212.74 LBS | HEIGHT: 66 IN | BODY MASS INDEX: 34.19 KG/M2

## 2025-05-30 DIAGNOSIS — R06.09 DYSPNEA ON EXERTION: ICD-10-CM

## 2025-05-30 LAB
BH CV REST NUCLEAR ISOTOPE DOSE: 9.6 MCI
BH CV STRESS BP STAGE 2: NORMAL
BH CV STRESS BP STAGE 4: NORMAL
BH CV STRESS COMMENTS STAGE 1: NORMAL
BH CV STRESS DOSE REGADENOSON STAGE 1: 0.4
BH CV STRESS DURATION MIN STAGE 1: 1
BH CV STRESS DURATION MIN STAGE 2: 1
BH CV STRESS DURATION MIN STAGE 3: 1
BH CV STRESS DURATION MIN STAGE 4: 1
BH CV STRESS DURATION SEC STAGE 1: 0
BH CV STRESS DURATION SEC STAGE 2: 0
BH CV STRESS DURATION SEC STAGE 3: 0
BH CV STRESS DURATION SEC STAGE 4: 0
BH CV STRESS HR STAGE 1: 95
BH CV STRESS HR STAGE 2: 108
BH CV STRESS HR STAGE 3: 90
BH CV STRESS HR STAGE 4: 93
BH CV STRESS NUCLEAR ISOTOPE DOSE: 33 MCI
BH CV STRESS O2 STAGE 1: 96
BH CV STRESS O2 STAGE 2: 95
BH CV STRESS O2 STAGE 3: 98
BH CV STRESS O2 STAGE 4: 98
BH CV STRESS PROTOCOL 1: NORMAL
BH CV STRESS RECOVERY BP: NORMAL MMHG
BH CV STRESS RECOVERY HR: 86 BPM
BH CV STRESS RECOVERY O2: 95 %
BH CV STRESS STAGE 1: 1
BH CV STRESS STAGE 2: 2
BH CV STRESS STAGE 3: 3
BH CV STRESS STAGE 4: 4
MAXIMAL PREDICTED HEART RATE: 156 BPM
PERCENT MAX PREDICTED HR: 69.23 %
SPECT HRT GATED+EF W RNC IV: 60 %
STRESS BASELINE BP: NORMAL MMHG
STRESS BASELINE HR: 86 BPM
STRESS O2 SAT REST: 96 %
STRESS PERCENT HR: 81 %
STRESS POST ESTIMATED WORKLOAD: 1 METS
STRESS POST EXERCISE DUR MIN: 4 MIN
STRESS POST EXERCISE DUR SEC: 0 SEC
STRESS POST O2 SAT PEAK: 98 %
STRESS POST PEAK BP: NORMAL MMHG
STRESS POST PEAK HR: 108 BPM
STRESS TARGET HR: 133 BPM

## 2025-05-30 PROCEDURE — 34310000005 TECHNETIUM SESTAMIBI: Performed by: INTERNAL MEDICINE

## 2025-05-30 PROCEDURE — A9500 TC99M SESTAMIBI: HCPCS | Performed by: INTERNAL MEDICINE

## 2025-05-30 PROCEDURE — 78452 HT MUSCLE IMAGE SPECT MULT: CPT

## 2025-05-30 PROCEDURE — 25010000002 REGADENOSON 0.4 MG/5ML SOLUTION: Performed by: INTERNAL MEDICINE

## 2025-05-30 PROCEDURE — 93017 CV STRESS TEST TRACING ONLY: CPT

## 2025-05-30 RX ORDER — REGADENOSON 0.08 MG/ML
0.4 INJECTION, SOLUTION INTRAVENOUS ONCE
Status: COMPLETED | OUTPATIENT
Start: 2025-05-30 | End: 2025-05-30

## 2025-05-30 RX ADMIN — TECHNETIUM TC 99M SESTAMIBI 1 DOSE: 1 INJECTION INTRAVENOUS at 10:23

## 2025-05-30 RX ADMIN — TECHNETIUM TC 99M SESTAMIBI 1 DOSE: 1 INJECTION INTRAVENOUS at 08:45

## 2025-05-30 RX ADMIN — REGADENOSON 0.4 MG: 0.08 INJECTION, SOLUTION INTRAVENOUS at 10:23

## 2025-05-30 NOTE — TELEPHONE ENCOUNTER
Per NSK - Can you let him know that the stress test shows a small area of scarring from his previous heart attack.  However there does not appear to be any other heart muscle at risk no signs of other new blockages.  Overall looks reassuring.  Continue his same medications at this time.    Spoke with patient.  He is instructed of above and verbalizes understanding.